# Patient Record
Sex: MALE | Race: BLACK OR AFRICAN AMERICAN | Employment: UNEMPLOYED | URBAN - METROPOLITAN AREA
[De-identification: names, ages, dates, MRNs, and addresses within clinical notes are randomized per-mention and may not be internally consistent; named-entity substitution may affect disease eponyms.]

---

## 2020-09-06 ENCOUNTER — HOSPITAL ENCOUNTER (EMERGENCY)
Age: 30
Discharge: HOME | End: 2020-09-06
Attending: EMERGENCY MEDICINE

## 2020-09-06 ENCOUNTER — APPOINTMENT (OUTPATIENT)
Dept: GENERAL RADIOLOGY | Age: 30
End: 2020-09-06
Attending: EMERGENCY MEDICINE

## 2020-09-06 VITALS
BODY MASS INDEX: 30.31 KG/M2 | RESPIRATION RATE: 12 BRPM | DIASTOLIC BLOOD PRESSURE: 80 MMHG | OXYGEN SATURATION: 99 % | TEMPERATURE: 98.7 F | SYSTOLIC BLOOD PRESSURE: 130 MMHG | WEIGHT: 200 LBS | HEIGHT: 68 IN | HEART RATE: 78 BPM

## 2020-09-06 DIAGNOSIS — S86.812A RUPTURE OF LEFT PATELLAR TENDON, INITIAL ENCOUNTER: Primary | ICD-10-CM

## 2020-09-06 PROCEDURE — 74011250637 HC RX REV CODE- 250/637: Performed by: EMERGENCY MEDICINE

## 2020-09-06 PROCEDURE — 99284 EMERGENCY DEPT VISIT MOD MDM: CPT

## 2020-09-06 PROCEDURE — 73562 X-RAY EXAM OF KNEE 3: CPT

## 2020-09-06 RX ORDER — OXYCODONE AND ACETAMINOPHEN 5; 325 MG/1; MG/1
1 TABLET ORAL ONCE
Status: COMPLETED | OUTPATIENT
Start: 2020-09-06 | End: 2020-09-06

## 2020-09-06 RX ORDER — OXYCODONE AND ACETAMINOPHEN 5; 325 MG/1; MG/1
1-2 TABLET ORAL
Qty: 20 TAB | Refills: 0 | Status: SHIPPED | OUTPATIENT
Start: 2020-09-06 | End: 2020-09-11

## 2020-09-06 RX ORDER — NAPROXEN 500 MG/1
500 TABLET ORAL 2 TIMES DAILY WITH MEALS
Qty: 20 TAB | Refills: 0 | Status: SHIPPED | OUTPATIENT
Start: 2020-09-06 | End: 2020-09-16

## 2020-09-06 RX ORDER — IBUPROFEN 800 MG/1
800 TABLET ORAL ONCE
Status: COMPLETED | OUTPATIENT
Start: 2020-09-06 | End: 2020-09-06

## 2020-09-06 RX ADMIN — IBUPROFEN 800 MG: 800 TABLET, FILM COATED ORAL at 21:43

## 2020-09-06 RX ADMIN — OXYCODONE HYDROCHLORIDE AND ACETAMINOPHEN 1 TABLET: 5; 325 TABLET ORAL at 21:43

## 2020-09-07 NOTE — ED PROVIDER NOTES
Patient is a 71-year-old male who comes to the emergency department today with left knee pain. He was playing basketball with some family member states he went to jump and felt like his left knee pulled apart. He has difficulty walking on it. The kneecap feels like it is moving. He did fall to the ground. The history is provided by the patient. Knee Pain    This is a new problem. The current episode started less than 1 hour ago. The problem occurs constantly. The problem has not changed since onset. The pain is present in the left knee. The pain is moderate. Associated symptoms include limited range of motion. Pertinent negatives include no back pain and no neck pain. The symptoms are aggravated by standing, activity and palpation. He has tried nothing for the symptoms. The treatment provided no relief. There has been a history of trauma. No past medical history on file. No past surgical history on file. No family history on file.     Social History     Socioeconomic History    Marital status: SINGLE     Spouse name: Not on file    Number of children: Not on file    Years of education: Not on file    Highest education level: Not on file   Occupational History    Not on file   Social Needs    Financial resource strain: Not on file    Food insecurity     Worry: Not on file     Inability: Not on file    Transportation needs     Medical: Not on file     Non-medical: Not on file   Tobacco Use    Smoking status: Not on file   Substance and Sexual Activity    Alcohol use: Not on file    Drug use: Not on file    Sexual activity: Not on file   Lifestyle    Physical activity     Days per week: Not on file     Minutes per session: Not on file    Stress: Not on file   Relationships    Social connections     Talks on phone: Not on file     Gets together: Not on file     Attends Hindu service: Not on file     Active member of club or organization: Not on file     Attends meetings of clubs or organizations: Not on file     Relationship status: Not on file    Intimate partner violence     Fear of current or ex partner: Not on file     Emotionally abused: Not on file     Physically abused: Not on file     Forced sexual activity: Not on file   Other Topics Concern    Not on file   Social History Narrative    Not on file         ALLERGIES: Patient has no known allergies. Review of Systems   Constitutional: Negative for chills, fatigue and fever. HENT: Negative for congestion, rhinorrhea and sore throat. Eyes: Negative for pain, discharge and visual disturbance. Respiratory: Negative for cough and shortness of breath. Cardiovascular: Negative for chest pain and palpitations. Gastrointestinal: Negative for abdominal pain, diarrhea and nausea. Endocrine: Negative for polydipsia and polyuria. Genitourinary: Negative for dysuria, frequency and urgency. Musculoskeletal: Negative for back pain and neck pain. Skin: Negative for rash. Neurological: Negative for seizures, syncope and weakness. Hematological: Negative. Vitals:    09/06/20 2058 09/06/20 2100 09/06/20 2102 09/06/20 2102   BP:       Pulse:       Resp:  12     Temp:  98.7 °F (37.1 °C)     SpO2: 97%  98% 98%   Weight:  90.7 kg (200 lb)     Height:  5' 8\" (1.727 m)              Physical Exam  Vitals signs and nursing note reviewed. Constitutional:       Appearance: Normal appearance. HENT:      Head: Normocephalic and atraumatic. Neck:      Musculoskeletal: Normal range of motion. No muscular tenderness. Cardiovascular:      Pulses: Normal pulses. Musculoskeletal:         General: Swelling and tenderness present. Comments: Very tender left knee with limited range of motion. There is a palpable effusion. The patella is also freely mobile. Able to straight leg raise. Concern for tendon rupture. Skin:     General: Skin is warm and dry. Capillary Refill: Capillary refill takes less than 2 seconds. Neurological:      General: No focal deficit present. Mental Status: He is alert and oriented to person, place, and time. Cranial Nerves: No cranial nerve deficit. Sensory: No sensory deficit. Motor: No weakness. MDM  Number of Diagnoses or Management Options  Diagnosis management comments: I wore appropriate PPE throughout this patient's ED visit. Jenna Mcconnell MD, 9:09 PM    9:47 PM  Radiologist read the left knee x-ray is negative. I think the patella is high riding. On examination the collation clearly has a patellar tendon injury and rupture with the inability to straight leg raise. His contact information is left on the [facility] referral line. We will place him into a knee immobilizer splint and crutches. We will also prescribe some pain medicine.          Amount and/or Complexity of Data Reviewed  Tests in the radiology section of CPT®: ordered and reviewed  Independent visualization of images, tracings, or specimens: yes    Risk of Complications, Morbidity, and/or Mortality  Presenting problems: low  Diagnostic procedures: low  Management options: low    Patient Progress  Patient progress: stable         Procedures

## 2020-09-07 NOTE — DISCHARGE INSTRUCTIONS
Wear the immobilizer splint as instructed. Use the crutches to keep weight off the injured leg. Take the pain medication as prescribed. Apply an ice pack to decrease any swelling. Call orthopedics on Tuesday as referred to schedule follow-up appointment.

## 2020-09-07 NOTE — ED NOTES
I have reviewed discharge instructions with the patient. The patient verbalized understanding. Patient left ED via Discharge Method: wheelchair to Home with friend. Opportunity for questions and clarification provided. Patient given 2 scripts. To continue your aftercare when you leave the hospital, you may receive an automated call from our care team to check in on how you are doing. This is a free service and part of our promise to provide the best care and service to meet your aftercare needs.  If you have questions, or wish to unsubscribe from this service please call [phone]. Thank you for Choosing our [facility] Emergency Department.

## 2020-09-10 ENCOUNTER — ANESTHESIA EVENT (OUTPATIENT)
Dept: SURGERY | Age: 30
End: 2020-09-10

## 2020-09-10 RX ORDER — SODIUM CHLORIDE 0.9 % (FLUSH) 0.9 %
5-40 SYRINGE (ML) INJECTION AS NEEDED
Status: CANCELLED | OUTPATIENT
Start: 2020-09-10

## 2020-09-10 RX ORDER — SODIUM CHLORIDE 0.9 % (FLUSH) 0.9 %
5-40 SYRINGE (ML) INJECTION EVERY 8 HOURS
Status: CANCELLED | OUTPATIENT
Start: 2020-09-10

## 2020-09-11 ENCOUNTER — ANESTHESIA (OUTPATIENT)
Dept: SURGERY | Age: 30
End: 2020-09-11

## 2020-09-11 ENCOUNTER — HOSPITAL ENCOUNTER (OUTPATIENT)
Age: 30
Setting detail: OUTPATIENT SURGERY
Discharge: HOME | End: 2020-09-11
Attending: ORTHOPAEDIC SURGERY | Admitting: ORTHOPAEDIC SURGERY

## 2020-09-11 VITALS
BODY MASS INDEX: 30.41 KG/M2 | OXYGEN SATURATION: 98 % | WEIGHT: 200 LBS | DIASTOLIC BLOOD PRESSURE: 80 MMHG | TEMPERATURE: 97.5 F | SYSTOLIC BLOOD PRESSURE: 133 MMHG | HEART RATE: 72 BPM | RESPIRATION RATE: 16 BRPM

## 2020-09-11 PROCEDURE — 77030018074 HC RTVR SUT ARTH4 S&N -B: Performed by: ORTHOPAEDIC SURGERY

## 2020-09-11 PROCEDURE — 77030002933 HC SUT MCRYL J&J -A: Performed by: ORTHOPAEDIC SURGERY

## 2020-09-11 PROCEDURE — L1832 KO ADJ JNT POS R SUP PRE CST: HCPCS | Performed by: ORTHOPAEDIC SURGERY

## 2020-09-11 PROCEDURE — 2709999900 HC NON-CHARGEABLE SUPPLY: Performed by: ORTHOPAEDIC SURGERY

## 2020-09-11 PROCEDURE — 74011250636 HC RX REV CODE- 250/636: Performed by: REGISTERED NURSE

## 2020-09-11 PROCEDURE — 77030008462 HC STPLR SKN PROX J&J -A: Performed by: ORTHOPAEDIC SURGERY

## 2020-09-11 PROCEDURE — 76210000006 HC OR PH I REC 0.5 TO 1 HR: Performed by: ORTHOPAEDIC SURGERY

## 2020-09-11 PROCEDURE — 77030002922 HC SUT FBRWRE ARTH -B: Performed by: ORTHOPAEDIC SURGERY

## 2020-09-11 PROCEDURE — 77030003377 HC NDL ABD CUT ANCH -A: Performed by: ORTHOPAEDIC SURGERY

## 2020-09-11 PROCEDURE — 76210000020 HC REC RM PH II FIRST 0.5 HR: Performed by: ORTHOPAEDIC SURGERY

## 2020-09-11 PROCEDURE — 74011250636 HC RX REV CODE- 250/636: Performed by: ANESTHESIOLOGY

## 2020-09-11 PROCEDURE — 76010010054 HC POST OP PAIN BLOCK: Performed by: ORTHOPAEDIC SURGERY

## 2020-09-11 PROCEDURE — 77030016370 HC SUT ULTBRD S&N -B: Performed by: ORTHOPAEDIC SURGERY

## 2020-09-11 PROCEDURE — 77030010509 HC AIRWY LMA MSK TELE -A: Performed by: ANESTHESIOLOGY

## 2020-09-11 PROCEDURE — 76010000162 HC OR TIME 1.5 TO 2 HR INTENSV-TIER 1: Performed by: ORTHOPAEDIC SURGERY

## 2020-09-11 PROCEDURE — 77030002966 HC SUT PDS J&J -A: Performed by: ORTHOPAEDIC SURGERY

## 2020-09-11 PROCEDURE — 77030031139 HC SUT VCRL2 J&J -A: Performed by: ORTHOPAEDIC SURGERY

## 2020-09-11 PROCEDURE — 74011250636 HC RX REV CODE- 250/636: Performed by: ORTHOPAEDIC SURGERY

## 2020-09-11 PROCEDURE — 77030003602 HC NDL NRV BLK BBMI -B: Performed by: ANESTHESIOLOGY

## 2020-09-11 PROCEDURE — 76060000034 HC ANESTHESIA 1.5 TO 2 HR: Performed by: ORTHOPAEDIC SURGERY

## 2020-09-11 PROCEDURE — 74011250636 HC RX REV CODE- 250/636: Performed by: NURSE ANESTHETIST, CERTIFIED REGISTERED

## 2020-09-11 PROCEDURE — 76942 ECHO GUIDE FOR BIOPSY: CPT | Performed by: ORTHOPAEDIC SURGERY

## 2020-09-11 PROCEDURE — 74011000250 HC RX REV CODE- 250: Performed by: REGISTERED NURSE

## 2020-09-11 PROCEDURE — 77030000032 HC CUF TRNQT ZIMM -B: Performed by: ORTHOPAEDIC SURGERY

## 2020-09-11 PROCEDURE — 74011250637 HC RX REV CODE- 250/637: Performed by: ANESTHESIOLOGY

## 2020-09-11 PROCEDURE — 74011000250 HC RX REV CODE- 250: Performed by: NURSE ANESTHETIST, CERTIFIED REGISTERED

## 2020-09-11 RX ORDER — NALOXONE HYDROCHLORIDE 0.4 MG/ML
0.1 INJECTION, SOLUTION INTRAMUSCULAR; INTRAVENOUS; SUBCUTANEOUS AS NEEDED
Status: DISCONTINUED | OUTPATIENT
Start: 2020-09-11 | End: 2020-09-11 | Stop reason: HOSPADM

## 2020-09-11 RX ORDER — FENTANYL CITRATE 50 UG/ML
INJECTION, SOLUTION INTRAMUSCULAR; INTRAVENOUS AS NEEDED
Status: DISCONTINUED | OUTPATIENT
Start: 2020-09-11 | End: 2020-09-11 | Stop reason: HOSPADM

## 2020-09-11 RX ORDER — SODIUM CHLORIDE 0.9 % (FLUSH) 0.9 %
5-40 SYRINGE (ML) INJECTION EVERY 8 HOURS
Status: DISCONTINUED | OUTPATIENT
Start: 2020-09-11 | End: 2020-09-11 | Stop reason: HOSPADM

## 2020-09-11 RX ORDER — LIDOCAINE HYDROCHLORIDE 20 MG/ML
INJECTION, SOLUTION EPIDURAL; INFILTRATION; INTRACAUDAL; PERINEURAL AS NEEDED
Status: DISCONTINUED | OUTPATIENT
Start: 2020-09-11 | End: 2020-09-11 | Stop reason: HOSPADM

## 2020-09-11 RX ORDER — PROPOFOL 10 MG/ML
INJECTION, EMULSION INTRAVENOUS AS NEEDED
Status: DISCONTINUED | OUTPATIENT
Start: 2020-09-11 | End: 2020-09-11 | Stop reason: HOSPADM

## 2020-09-11 RX ORDER — SODIUM CHLORIDE 0.9 % (FLUSH) 0.9 %
5-40 SYRINGE (ML) INJECTION AS NEEDED
Status: DISCONTINUED | OUTPATIENT
Start: 2020-09-11 | End: 2020-09-11 | Stop reason: HOSPADM

## 2020-09-11 RX ORDER — DEXAMETHASONE SODIUM PHOSPHATE 4 MG/ML
INJECTION, SOLUTION INTRA-ARTICULAR; INTRALESIONAL; INTRAMUSCULAR; INTRAVENOUS; SOFT TISSUE AS NEEDED
Status: DISCONTINUED | OUTPATIENT
Start: 2020-09-11 | End: 2020-09-11 | Stop reason: HOSPADM

## 2020-09-11 RX ORDER — HYDROCODONE BITARTRATE AND ACETAMINOPHEN 7.5; 325 MG/1; MG/1
1 TABLET ORAL AS NEEDED
Status: DISCONTINUED | OUTPATIENT
Start: 2020-09-11 | End: 2020-09-11 | Stop reason: HOSPADM

## 2020-09-11 RX ORDER — ONDANSETRON 2 MG/ML
INJECTION INTRAMUSCULAR; INTRAVENOUS AS NEEDED
Status: DISCONTINUED | OUTPATIENT
Start: 2020-09-11 | End: 2020-09-11 | Stop reason: HOSPADM

## 2020-09-11 RX ORDER — CEFAZOLIN SODIUM/WATER 2 G/20 ML
2 SYRINGE (ML) INTRAVENOUS ONCE
Status: COMPLETED | OUTPATIENT
Start: 2020-09-11 | End: 2020-09-11

## 2020-09-11 RX ORDER — FAMOTIDINE 20 MG/1
20 TABLET, FILM COATED ORAL ONCE
Status: COMPLETED | OUTPATIENT
Start: 2020-09-11 | End: 2020-09-11

## 2020-09-11 RX ORDER — LIDOCAINE HYDROCHLORIDE 10 MG/ML
0.1 INJECTION INFILTRATION; PERINEURAL AS NEEDED
Status: DISCONTINUED | OUTPATIENT
Start: 2020-09-11 | End: 2020-09-11 | Stop reason: HOSPADM

## 2020-09-11 RX ORDER — ROPIVACAINE HYDROCHLORIDE 5 MG/ML
INJECTION, SOLUTION EPIDURAL; INFILTRATION; PERINEURAL
Status: COMPLETED | OUTPATIENT
Start: 2020-09-11 | End: 2020-09-11

## 2020-09-11 RX ORDER — OXYCODONE HYDROCHLORIDE 5 MG/1
5 TABLET ORAL
Status: DISCONTINUED | OUTPATIENT
Start: 2020-09-11 | End: 2020-09-11 | Stop reason: HOSPADM

## 2020-09-11 RX ORDER — FENTANYL CITRATE 50 UG/ML
100 INJECTION, SOLUTION INTRAMUSCULAR; INTRAVENOUS ONCE
Status: COMPLETED | OUTPATIENT
Start: 2020-09-11 | End: 2020-09-11

## 2020-09-11 RX ORDER — SODIUM CHLORIDE, SODIUM LACTATE, POTASSIUM CHLORIDE, CALCIUM CHLORIDE 600; 310; 30; 20 MG/100ML; MG/100ML; MG/100ML; MG/100ML
100 INJECTION, SOLUTION INTRAVENOUS CONTINUOUS
Status: DISCONTINUED | OUTPATIENT
Start: 2020-09-11 | End: 2020-09-11 | Stop reason: HOSPADM

## 2020-09-11 RX ORDER — HYDROMORPHONE HYDROCHLORIDE 2 MG/ML
0.5 INJECTION, SOLUTION INTRAMUSCULAR; INTRAVENOUS; SUBCUTANEOUS
Status: DISCONTINUED | OUTPATIENT
Start: 2020-09-11 | End: 2020-09-11 | Stop reason: HOSPADM

## 2020-09-11 RX ORDER — SODIUM CHLORIDE 9 MG/ML
25 INJECTION, SOLUTION INTRAVENOUS CONTINUOUS
Status: DISCONTINUED | OUTPATIENT
Start: 2020-09-11 | End: 2020-09-11 | Stop reason: HOSPADM

## 2020-09-11 RX ORDER — MIDAZOLAM HYDROCHLORIDE 1 MG/ML
2 INJECTION, SOLUTION INTRAMUSCULAR; INTRAVENOUS
Status: COMPLETED | OUTPATIENT
Start: 2020-09-11 | End: 2020-09-11

## 2020-09-11 RX ADMIN — FENTANYL CITRATE 25 MCG: 50 INJECTION INTRAMUSCULAR; INTRAVENOUS at 12:32

## 2020-09-11 RX ADMIN — SODIUM CHLORIDE, SODIUM LACTATE, POTASSIUM CHLORIDE, AND CALCIUM CHLORIDE 100 ML/HR: 600; 310; 30; 20 INJECTION, SOLUTION INTRAVENOUS at 11:53

## 2020-09-11 RX ADMIN — HYDROCODONE BITARTRATE AND ACETAMINOPHEN 1 TABLET: 7.5; 325 TABLET ORAL at 14:19

## 2020-09-11 RX ADMIN — DEXAMETHASONE SODIUM PHOSPHATE 4 MG: 4 INJECTION, SOLUTION INTRAMUSCULAR; INTRAVENOUS at 13:21

## 2020-09-11 RX ADMIN — ONDANSETRON 4 MG: 2 INJECTION INTRAMUSCULAR; INTRAVENOUS at 13:21

## 2020-09-11 RX ADMIN — HYDROMORPHONE HYDROCHLORIDE 0.5 MG: 2 INJECTION INTRAMUSCULAR; INTRAVENOUS; SUBCUTANEOUS at 14:12

## 2020-09-11 RX ADMIN — FENTANYL CITRATE 100 MCG: 50 INJECTION, SOLUTION INTRAMUSCULAR; INTRAVENOUS at 11:53

## 2020-09-11 RX ADMIN — Medication 2 G: at 12:28

## 2020-09-11 RX ADMIN — MIDAZOLAM 2 MG: 1 INJECTION INTRAMUSCULAR; INTRAVENOUS at 11:54

## 2020-09-11 RX ADMIN — FAMOTIDINE 20 MG: 20 TABLET, FILM COATED ORAL at 11:53

## 2020-09-11 RX ADMIN — PROPOFOL 200 MG: 10 INJECTION, EMULSION INTRAVENOUS at 12:23

## 2020-09-11 RX ADMIN — LIDOCAINE HYDROCHLORIDE 80 MG: 20 INJECTION, SOLUTION EPIDURAL; INFILTRATION; INTRACAUDAL; PERINEURAL at 12:23

## 2020-09-11 RX ADMIN — FENTANYL CITRATE 75 MCG: 50 INJECTION INTRAMUSCULAR; INTRAVENOUS at 12:23

## 2020-09-11 RX ADMIN — ROPIVACAINE HYDROCHLORIDE 30 ML: 150 INJECTION, SOLUTION EPIDURAL; INFILTRATION; PERINEURAL at 11:56

## 2020-09-11 RX ADMIN — PHENYLEPHRINE HYDROCHLORIDE 100 MCG: 10 INJECTION INTRAVENOUS at 13:05

## 2020-09-11 NOTE — ANESTHESIA PREPROCEDURE EVALUATION
Relevant Problems   No relevant active problems       Anesthetic History               Review of Systems / Medical History  Patient summary reviewed and pertinent labs reviewed    Pulmonary          Smoker         Neuro/Psych              Cardiovascular                  Exercise tolerance: >4 METS     GI/Hepatic/Renal                Endo/Other             Other Findings              Physical Exam    Airway  Mallampati: II  TM Distance: > 6 cm  Neck ROM: normal range of motion   Mouth opening: Normal     Cardiovascular  Regular rate and rhythm,  S1 and S2 normal,  no murmur, click, rub, or gallop             Dental  No notable dental hx       Pulmonary  Breath sounds clear to auscultation               Abdominal         Other Findings            Anesthetic Plan    ASA: 2  Anesthesia type: general      Post-op pain plan if not by surgeon: peripheral nerve block single      Anesthetic plan and risks discussed with: Patient

## 2020-09-11 NOTE — DISCHARGE INSTRUCTIONS
Post-Operative Instructions   For  Anterior Cruciate Ligament Reconstruction  Phone:  [phone]    1. Unless otherwise instructed, you may place as much weight as tolerated on the operated leg. Use crutches to help with ambulation. 2.  If you do not have an \"Iceman\" type cooling unit, for the first 48-72 hours following surgery, use ice on the knee every two hours (while awake) for 20-30 minutes at a time to help prevent swelling and lessen pain. If you have a cooling unit, follow the instructions given to you- continually as much as possible the first 48-72 hours, then 3-4 times a day for 4 weeks. Elevate leg. 3. You have been given a hinged brace. Keep the brace locked in a straight position at all times until you begin therapy. 4. Wrap your leg with a large plastic bag to keep the dressings dry while you shower. 5. Use any pain medication as instructed. You should take your pain medication as soon as you feel the anesthetic wearing off. Do not wait until you are in severe pain to begin taking your pain medication. 6. You may have some side effects from your pain medication. If you have nausea, try taking your medication with food. For itching, you may take over the counter Benadryl. 7. Begin therapy as ordered    8. You may have been given a prescription for Zofran or Phenergan. This medication is used for nausea and vomiting. You do not need to get this prescription filled unless you have a problem. 9. If you have a problem, please call [address] [place] [address] at [phone]    Cami Elliott MD    [address] [place] [address], P.A[facility] ACTIVITY  · As tolerated and as directed by your doctor. · Bathe or shower as directed by your doctor. DIET  · Clear liquids until no nausea or vomiting; then light diet for the first day. · Advance to regular diet on second day, unless your doctor orders otherwise. · If nausea and vomiting continues, call your doctor. PAIN  · Take pain medication as directed by your doctor. · Call your doctor if pain is NOT relieved by medication. · DO NOT take aspirin of blood thinners unless directed by your doctor. DRESSING CARE       CALL YOUR DOCTOR IF   · Excessive bleeding that does not stop after holding pressure over the area  · Temperature of 101 degrees F or above  · Excessive redness, swelling or bruising, and/ or green or yellow, smelly discharge from incision    AFTER ANESTHESIA   · For the first 24 hours: DO NOT Drive, Drink alcoholic beverages, or Make important decisions. · Be aware of dizziness following anesthesia and while taking pain medication. APPOINTMENT DATE/ TIME    YOUR DOCTOR'S PHONE NUMBER       DISCHARGE SUMMARY from Nurse    PATIENT INSTRUCTIONS:    After general anesthesia or intravenous sedation, for 24 hours or while taking prescription Narcotics:  · Limit your activities  · Do not drive and operate hazardous machinery  · Do not make important personal or business decisions  · Do  not drink alcoholic beverages  · If you have not urinated within 8 hours after discharge, please contact your surgeon on call. *  Please give a list of your current medications to your Primary Care Provider. *  Please update this list whenever your medications are discontinued, doses are      changed, or new medications (including over-the-counter products) are added. *  Please carry medication information at all times in case of emergency situations. These are general instructions for a healthy lifestyle:    No smoking/ No tobacco products/ Avoid exposure to second hand smoke    Surgeon General's Warning:  Quitting smoking now greatly reduces serious risk to your health.     Obesity, smoking, and sedentary lifestyle greatly increases your risk for illness    A healthy diet, regular physical exercise & weight monitoring are important for maintaining a healthy lifestyle    You may be retaining fluid if you have a history of heart failure or if you experience any of the following symptoms:  Weight gain of 3 pounds or more overnight or 5 pounds in a week, increased swelling in our hands or feet or shortness of breath while lying flat in bed. Please call your doctor as soon as you notice any of these symptoms; do not wait until your next office visit. Recognize signs and symptoms of STROKE:    F-face looks uneven    A-arms unable to move or move unevenly    S-speech slurred or non-existent    T-time-call 911 as soon as signs and symptoms begin-DO NOT go       Back to bed or wait to see if you get better-TIME IS BRAIN. Advance Care Planning  People with COVID-19 may have no symptoms, mild symptoms, such as fever, cough, and shortness of breath or they may have more severe illness, developing severe and fatal pneumonia. As a result, Advance Care Planning with attention to naming a health care decision maker (someone you trust to make healthcare decisions for you if you could not speak for yourself) and sharing other health care preferences is important BEFORE a possible health crisis. Please contact your Primary Care Provider to discuss Advance Care Planning. Preventing the Spread of Coronavirus Disease 2019 in Homes and Residential Communities  For the most recent information go to [email or web address]    Prevention steps for People with confirmed or suspected COVID-19 (including persons under investigation) who do not need to be hospitalized  and   People with confirmed COVID-19 who were hospitalized and determined to be medically stable to go home    Your healthcare provider and public health staff will evaluate whether you can be cared for at home. If it is determined that you do not need to be hospitalized and can be isolated at home, you will be monitored by staff from your local or state health department.  You should follow the prevention steps below until a healthcare provider or local or state health department says you can return to your normal activities. Stay home except to get medical care  People who are mildly ill with COVID-19 are able to isolate at home during their illness. You should restrict activities outside your home, except for getting medical care. Do not go to work, school, or public areas. Avoid using public transportation, ride-sharing, or taxis. Separate yourself from other people and animals in your home  People: As much as possible, you should stay in a specific room and away from other people in your home. Also, you should use a separate bathroom, if available. Animals: You should restrict contact with pets and other animals while you are sick with COVID-19, just like you would around other people. Although there have not been reports of pets or other animals becoming sick with COVID-19, it is still recommended that people sick with COVID-19 limit contact with animals until more information is known about the virus. When possible, have another member of your household care for your animals while you are sick. If you are sick with COVID-19, avoid contact with your pet, including petting, snuggling, being kissed or licked, and sharing food. If you must care for your pet or be around animals while you are sick, wash your hands before and after you interact with pets and wear a facemask. Call ahead before visiting your doctor  If you have a medical appointment, call the healthcare provider and tell them that you have or may have COVID-19. This will help the healthcare providers office take steps to keep other people from getting infected or exposed. Wear a facemask  You should wear a facemask when you are around other people (e.g., sharing a room or vehicle) or pets and before you enter a healthcare providers office.  If you are not able to wear a facemask (for example, because it causes trouble breathing), then people who live with you should not stay in the same room with you, or they should wear a facemask if they enter your room. Cover your coughs and sneezes  Cover your mouth and nose with a tissue when you cough or sneeze. Throw used tissues in a lined trash can. Immediately wash your hands with soap and water for at least 20 seconds or, if soap and water are not available, clean your hands with an alcohol-based hand  that contains at least 60% alcohol. Clean your hands often  Wash your hands often with soap and water for at least 20 seconds, especially after blowing your nose, coughing, or sneezing; going to the bathroom; and before eating or preparing food. If soap and water are not readily available, use an alcohol-based hand  with at least 60% alcohol, covering all surfaces of your hands and rubbing them together until they feel dry. Soap and water are the best option if hands are visibly dirty. Avoid touching your eyes, nose, and mouth with unwashed hands. Avoid sharing personal household items  You should not share dishes, drinking glasses, cups, eating utensils, towels, or bedding with other people or pets in your home. After using these items, they should be washed thoroughly with soap and water. Clean all high-touch surfaces everyday  High touch surfaces include counters, tabletops, doorknobs, bathroom fixtures, toilets, phones, keyboards, tablets, and bedside tables. Also, clean any surfaces that may have blood, stool, or body fluids on them. Use a household cleaning spray or wipe, according to the label instructions. Labels contain instructions for safe and effective use of the cleaning product including precautions you should take when applying the product, such as wearing gloves and making sure you have good ventilation during use of the product. Monitor your symptoms  Seek prompt medical attention if your illness is worsening (e.g., difficulty breathing).  Before seeking care, call your healthcare provider and tell them that you have, or are being evaluated for, COVID-19. Put on a facemask before you enter the facility. These steps will help the healthcare providers office to keep other people in the office or waiting room from getting infected or exposed. Ask your healthcare provider to call the local or state health department. Persons who are placed under active monitoring or facilitated self-monitoring should follow instructions provided by their local health department or occupational health professionals, as appropriate. When working with your local health department check their available hours. If you have a medical emergency and need to call 911, notify the dispatch personnel that you have, or are being evaluated for COVID-19. If possible, put on a facemask before emergency medical services arrive. Discontinuing home isolation  Patients with confirmed COVID-19 should remain under home isolation precautions until the risk of secondary transmission to others is thought to be low. The decision to discontinue home isolation precautions should be made on a case-by-case basis, in consultation with healthcare providers and state and local health departments.

## 2020-09-11 NOTE — OP NOTES
[address] [facility]  OPERATIVE REPORT    Name:  Jacqueline Gilmore  MR#:  [id number]  :  1990  ACCOUNT #:  [de-identified]  DATE OF SERVICE:  2020    PREOPERATIVE DIAGNOSIS:  Left patellar tendon tear. POSTOPERATIVE DIAGNOSIS:  Left patellar tendon tear. PROCEDURE PERFORMED:  Repair of the left patellar tendon. SURGEON:  Dalton Foster MD    ASSISTANT:  none    ANESTHESIA:  General.    COMPLICATIONS:   none. SPECIMENS REMOVED:  none. IMPLANTS:  none. ESTIMATED BLOOD LOSS:  20 mL. PROCEDURE:  After an adequate level of general anesthesia was obtained, the left leg was prepped and draped in the usual sterile fashion. Tourniquet was used for part of the procedure. Received antibiotics and had a block per Anesthesia for postop pain management. A longitudinal incision was made over the patellar tendon. Dissection was carried down through the subcutaneous tissue. The patient had a complete tear of the patellar tendon with tear of the retinaculum as well. The knee was then copiously irrigated. The superior pole of the patella was identified as well as the tibial tubercle. He had a good tendon even though was more , but he still had good tissue for the repair and a fairly thick tendon. The repair was performed by making drill holes in the bone. Three drill holes were made in the patella and brought out superiorly on the superior pole of the patella. Passing wires were used. A #2 FiberWire was used to weave the patellar tendon. The sutures were passed through the drill holes and then tied at the top of the patellar tendon. Good repair was performed. This was supplemented by using #5 FiberWire by drilling a hole through the tibial tubercle and the FiberWire was passed and then with a cerclage type repair. The cerclage suture was placed to augment the repair. The retinaculum was closed with a combination of FiberWire and PDS suture.   The tourniquet was released and hemostasis obtained. It was were very stable. The knee was put through some limited range of motion and was very stable. The wound was copiously irrigated. The deep tissue was closed with Vicryl, subcutaneous tissue with Monocryl, staples on the skin and sterile dressings applied, was placed in a hinged knee brace locked in full extension with careful instructions given to the family preoperatively and postop. The patient tolerated the procedure well.       Jasmin Tavarez MD      [id number]  D:  09/11/2020 13:56  T:  09/11/2020 14:41  JOB #:  [id number]  CC:  [facility]

## 2020-09-11 NOTE — ANESTHESIA POSTPROCEDURE EVALUATION
Procedure(s):  LEFT PATELLA TENDON REPAIR/ AUTOGRAFT. general    Anesthesia Post Evaluation      Multimodal analgesia: multimodal analgesia used between 6 hours prior to anesthesia start to PACU discharge  Patient location during evaluation: PACU  Patient participation: complete - patient participated  Level of consciousness: awake and awake and alert  Pain management: adequate  Airway patency: patent  Anesthetic complications: no  Cardiovascular status: acceptable  Respiratory status: acceptable  Hydration status: acceptable  Post anesthesia nausea and vomiting:  controlled      INITIAL Post-op Vital signs:   Vitals Value Taken Time   /87 9/11/2020  2:33 PM   Temp 36.4 °C (97.5 °F) 9/11/2020  2:00 PM   Pulse 72 9/11/2020  2:37 PM   Resp 14 9/11/2020  2:13 PM   SpO2 96 % 9/11/2020  2:37 PM   Vitals shown include unvalidated device data.

## 2020-09-11 NOTE — H&P
Outpatient Surgery History and Physical:  George Lawton was seen and examined. CHIEF COMPLAINT:   LT knee. HPI   compl  L knee rupture patella tendon jumping  PE: There were no vitals taken for this visit. Heart:   Regular rhythm      Lungs:  Are clear    Unable to ext knee defect  Past Medical History: There are no active problems to display for this patient. Surgical History:   Past Surgical History:   Procedure Laterality Date    HX WISDOM TEETH EXTRACTION         Social History: Patient  reports that he has been smoking. He has a 10.00 pack-year smoking history. He has never used smokeless tobacco. He reports current alcohol use. He reports previous drug use. Family History: No family history on file. Allergies: Reviewed per EMR  No Known Allergies    Medications:    No current facility-administered medications on file prior to encounter. Current Outpatient Medications on File Prior to Encounter   Medication Sig    naproxen (Naprosyn) 500 mg tablet Take 1 Tab by mouth two (2) times daily (with meals) for 10 days.  oxyCODONE-acetaminophen (Percocet) 5-325 mg per tablet Take 1-2 Tabs by mouth every six (6) hours as needed for Pain for up to 5 days. Max Daily Amount: 8 Tabs. The surgery is planned for the LT knee . History and physical has been reviewed. The patient has been examined. There have been no significant clinical changes since the completion of the originally dated History and Physical.  Patient identified by surgeon; surgical site was confirmed by patient and surgeon. The patient is here today for outpatient surgery. I have examined the patient, no changes are noted in the patient's medical status. Necessity for the procedure/care is still present and the history and physical above is current. See the office notes for the full long term history of the problem. Please see the recent office notes for the musculoskeletal examination.     Signed By: Pastora Fajardo Don Castro MD     September 11, 2020 7:01 AM       History and Physical

## 2020-09-11 NOTE — ANESTHESIA PROCEDURE NOTES
Peripheral Block    Start time: 9/11/2020 11:46 AM  End time: 9/11/2020 11:50 AM  Performed by: Mina Magana MD  Authorized by: Mina Magana MD       Pre-procedure:    Indications: at surgeon's request and post-op pain management    Preanesthetic Checklist: patient identified, risks and benefits discussed, site marked, timeout performed, anesthesia consent given and patient being monitored    Timeout Time: 11:46          Block Type:   Block Type:  Femoral single shot  Laterality:  Left  Monitoring:  Continuous pulse ox, frequent vital sign checks, heart rate, oxygen and responsive to questions  Injection Technique:  Single shot  Procedures: ultrasound guided and nerve stimulator    Patient Position: supine  Prep: DuraPrep    Location:  Upper thigh  Needle Type:  Stimuplex  Needle Gauge:  20 G  Needle Localization:  Nerve stimulator and ultrasound guidance  Motor Response: minimal motor response >0.4 mA      Assessment:  Number of attempts:  1  Injection Assessment:  Incremental injection every 5 mL, local visualized surrounding nerve on ultrasound, negative aspiration for blood, no paresthesia, no intravascular symptoms and ultrasound image on chart  Patient tolerance:  Patient tolerated the procedure well with no immediate complications

## 2020-10-29 ENCOUNTER — HOSPITAL ENCOUNTER (OUTPATIENT)
Dept: PHYSICAL THERAPY | Age: 30
Discharge: HOME | End: 2020-10-29

## 2020-10-29 PROCEDURE — 97161 PT EVAL LOW COMPLEX 20 MIN: CPT

## 2020-10-29 NOTE — THERAPY EVALUATION
Allison Marx  : 1990  Primary: [employer]  Secondary:  Therapy Center at [address], [address] [facility]  [phone]   [phone]          OUTPATIENT PHYSICAL THERAPY:Initial Assessment 10/29/2020   ICD-10: Treatment Diagnosis:   Muscle weakness (generalized) (M62.81); Difficulty in walking, not elsewhere classified (R26.2)  Pain in left knee (M25.562)  Stiffness of left knee, not elsewhere classified (U48.530)   Precautions/Allergies:   Patient has no known allergies. TREATMENT PLAN:  Effective Dates: 10/29/2020 TO 2021 (90 days). Frequency/Duration: 2 times a week for 90 Day(s) MEDICAL/REFERRING DIAGNOSIS:  Pain in left knee [M25.562]  Strain of other muscle(s) and tendon(s) at lower leg level, left leg, initial encounter [P60.597H]   DATE OF ONSET: 20  REFERRING PHYSICIAN: Lenita Epley, MD MD Orders: scott treat  Return MD Appointment: 20     INITIAL ASSESSMENT:  Mr. Ralph Guadarrama presents with decreased strength and range of motion and with increased pain at times s/p L patella tendon repiar with guarding at times. He will benefit from skilled PT interventions to maximize independence with functional mobility and p/o exercises. Pt did well with evaluation and should progress well with skilled PT for s/p L patella tend repair. PROBLEM LIST (Impacting functional limitations):  1. Decreased Strength  2. Decreased ADL/Functional Activities  3. Decreased Transfer Abilities  4. Decreased Ambulation Ability/Technique  5. Decreased Balance  6. Increased Pain  7. Decreased Activity Tolerance  8. Decreased Flexibility/Joint Mobility  9. Edema/Girth INTERVENTIONS PLANNED: (Treatment may consist of any combination of the following)  1. Balance Exercise  2. Cryotherapy  3. Electrical Stimulation  4. Gait Training  5. Home Exercise Program (HEP)  6. Manual Therapy  7. Neuromuscular Re-education/Strengthening  8.  Range of Motion (ROM)  9. Therapeutic Activites  10. Therapeutic Exercise/Strengthening  11. Transfer Training  12. vasocompression     GOALS: (Goals have been discussed and agreed upon with patient.)  Short term GOALS: Time Frame: 6 weeks                                                                                                                                       1.  Pt will be compliant and independent with advanced HEP in order to increase post pat tendon repair mobility and strength of the L LE.     2. Pt will increase score on the LEFS to 30/80 in order to demonstrate improved functional mobility and quality of life. 3. Pt will report standing for > 10 minutes with minimal to no increase in pain in order to be able to stand for prolonged periods as needed to perform standing for job. DISCHARGE GOALS: Time Frame: 12 weeks                                                                                                                                       1.  Pt will be compliant and independent with advanced HEP in order to increase post pat tendon repair mobility and strength of the L LE.     2. Pt will increase score on the LEFS to 55/80 in order to demonstrate improved functional mobility and quality of life. 3. Pt will report standing for > 30 minutes with minimal to no increase in pain in order to be able to stand for prolonged periods as needed to perform standing for job. 4. Patient will be able to  tolerate 8hr shift at work if cleared by MD.    OUTCOME MEASURE:   Tool Used: Lower Extremity Functional Scale (LEFS)  Score:  Initial: 18/80 Most Recent: X/80 (Date: -- )   Interpretation of Score: 20 questions each scored on a 5 point scale with 0 representing \"extreme difficulty or unable to perform\" and 4 representing \"no difficulty\". The lower the score, the greater the functional disability. 80/80 represents no disability. Minimal detectable change is 9 points.       MEDICAL NECESSITY: · Skilled intervention continues to be required due to decreased function. REASON FOR SERVICES/OTHER COMMENTS:  · Patient continues to require skilled intervention due to medical complications and patient unable to attend/participate in therapy as expected. Total Duration:  PT Patient Time In/Time Out  Time In: 1300  Time Out: 1345    Rehabilitation Potential For Stated Goals: Excellent  Regarding Linda Garcia's therapy, I certify that the treatment plan above will be carried out by a therapist or under their direction. Thank you for this referral,  Patrica Henson DPT     Referring Physician Signature: Arun Moreno MD _______________________________ Date _____________     PAIN/SUBJECTIVE:   Initial:   0 Post Session:  0/10   HISTORY:   History of Injury/Illness (Reason for Referral):  Pt reports tearing L patella tendon playing basketball on 9.6.2020. he had surgical repair on 9.11.2020. since then he has been NWB and minimal exer with ROM at odeg, bilat AC. He went to MD yesterday which lifted WB status to WBAT with using AC, ROM brace stays at 0deg. Past Medical History/Comorbidities:   Mr. Luci Romero  has no past medical history on file. Mr. Luci Romero  has a past surgical history that includes hx wisdom teeth extraction. Social History/Living Environment:     pt lives in house with spouse and infant, 1 story. Prior Level of Function/Work/Activity:  He is completely IND and very active with sports as exercise. 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 He plans to return when cleared. Hopes to return ASAP possibly with limited duty. Drives. .      Ambulatory/Rehab Services H2 Model Falls Risk Assessment   Risk Factors:       (1)  Gender [Male] Ability to Rise from Chair:       (0)  Ability to rise in a single movement   Falls Prevention Plan:        Mobility Assistance Device (specify):  [facility]   10/29/2020  Total: (5 or greater = High Risk): 1   ©2010 [facility] of [place] [employer] States Patent #[id number]. Federal Law prohibits the replication, distribution or use without written permission from [facility] of [employer]   Current Medications:     No current outpatient medications on file. Date Last Reviewed:  10/29/2020     Number of Personal Factors/Comorbidities that affect the Plan of Care: 0: LOW COMPLEXITY   EXAMINATION:   Observation/Orthostatic Postural Assessment:          Pt ambulating in NWB, despite WBAT status. Lots of fear with condition. Palpation:          No tenderness, warmth, dry skin, wound still healing. Patella mob 1/6* due to restrictions  ROM:            Range of Motion             Joint: Date:  10/29/2020  Date:   Date:       Right Left Right Left Right Left   HIP             Flex 60  60  AAROM           Abduction 40 40           KNEE               Flexion  120 0           Extension  0 0           ANKLE         DF WNL WNL                                STRENGTH               Joint: Date:   10/29/2020   Date:   Date:       Right Left Right Left Right Left   Ankle DF 5/5 5/5           Knee extension 5/5 NT           Hip flexion 5/5 1/5                        girth  41cm          Body Structures Involved:  1. Nerves  2. Eyes and Ears  3. Bones  4. Joints  5. Muscles  6. Ligaments Body Functions Affected:  1. Mental  2. Sensory/Pain  3. Neuromusculoskeletal  4. Movement Related Activities and Participation Affected:  1. General Tasks and Demands  2. Mobility  3. Self Care  4. Domestic Life  5.  Community, Social and [place]   Number of elements (examined above) that affect the Plan of Care: 1-2: LOW COMPLEXITY   CLINICAL PRESENTATION:   Presentation: Stable and uncomplicated: LOW COMPLEXITY   CLINICAL DECISION MAKING:   Use of outcome tool(s) and clinical judgement create a POC that gives a: Clear prediction of patient's progress: LOW COMPLEXITY

## 2020-11-03 ENCOUNTER — HOSPITAL ENCOUNTER (OUTPATIENT)
Dept: PHYSICAL THERAPY | Age: 30
Discharge: HOME | End: 2020-11-03

## 2020-11-03 PROCEDURE — 97016 VASOPNEUMATIC DEVICE THERAPY: CPT

## 2020-11-03 PROCEDURE — 97140 MANUAL THERAPY 1/> REGIONS: CPT

## 2020-11-03 PROCEDURE — 97110 THERAPEUTIC EXERCISES: CPT

## 2020-11-03 NOTE — PROGRESS NOTES
Papi Chenney  : 1990  Primary:   Secondary:  [address] at [facility]  [address], [address], [place], [facility] [address] [facility]  [phone]   [phone]        OUTPATIENT PHYSICAL THERAPY: Daily Treatment Note 11/3/2020  Visit Count:  2    L Pat tend repair    Pre-treatment Symptoms/Complaints:  Pt becoming more comfortable with walking  Pain: Initial:   0/10 Post Session:  0/10   Medications Last Reviewed:  11/3/2020  Updated Objective Findings:  using 1 crutch at arrival  TREATMENT:     THERAPEUTIC ACTIVITY: ( 0 minutes): Therapeutic activities per grid below to improve mobility, strength, balance and coordination. Required maximal verbal and manual cues to promote static and dynamic balance in standing, promote coordination of bilateral, lower extremity(s) and promote motor control of bilateral, lower extremity(s). THERAPEUTIC EXERCISE: (15 minutes):  Exercises per grid below to improve mobility, strength, balance and coordination. Required maximal verbal cues to promote proper body alignment, promote proper body posture and promote proper body breathing techniques. Progressed range and complexity of movement as indicated. NEUROMUSCULAR RE-EDUCATION: (0 minutes):  Exercise/activities per grid below to improve balance, coordination, kinesthetic sense, posture and proprioception. Required moderate verbal cues to promote static and dynamic balance in standing, promote coordination of bilateral, lower extremity(s) and promote motor control of bilateral, lower extremity(s). MANUAL THERAPY: (25 minutes): Joint mobilization, Soft tissue mobilization and Manipulation was utilized and necessary because of the patient's restricted joint motion, painful spasm, loss of articular motion and restricted motion of soft tissue.     Date:  11/3/20 Date:   Date:     Activity/Exercise Parameters Parameters Parameters   STM Patella mobs, quad, tend     PROM Knee flex man and dangling vasocomp x15min                                 [facility]  Treatment/Session Summary:    · Response to Treatment:  pt better with edema today. Beginning today with PROM flex, dangling and manual.   · Communication/Consultation:  None today  · Equipment provided today:  None today  · Recommendations/Intent for next treatment session: Next visit will focus on mobility, stretching, ROM, .     Total Treatment Billable Duration:  45 minutes  PT Patient Time In/Time Out  Time In: 0930  Time Out: 1434 [facility], DPT    Future Appointments   Date Time Provider Yinka Verma   11/5/2020  9:30 AM Iam Gonzalez, PT, DPT [facility]

## 2020-11-05 ENCOUNTER — HOSPITAL ENCOUNTER (OUTPATIENT)
Dept: PHYSICAL THERAPY | Age: 30
Discharge: HOME | End: 2020-11-05

## 2020-11-05 PROCEDURE — 97140 MANUAL THERAPY 1/> REGIONS: CPT

## 2020-11-05 PROCEDURE — 97110 THERAPEUTIC EXERCISES: CPT

## 2020-11-10 ENCOUNTER — HOSPITAL ENCOUNTER (OUTPATIENT)
Dept: PHYSICAL THERAPY | Age: 30
Discharge: HOME | End: 2020-11-10

## 2020-11-10 PROCEDURE — 97140 MANUAL THERAPY 1/> REGIONS: CPT

## 2020-11-10 PROCEDURE — 97016 VASOPNEUMATIC DEVICE THERAPY: CPT

## 2020-11-10 PROCEDURE — 97110 THERAPEUTIC EXERCISES: CPT

## 2020-11-10 NOTE — PROGRESS NOTES
Jaxon Doherty  : 1990  Primary:   Secondary:  [address] at [facility]  [address], [address] [facility] [place], [address] [place] [address]  [phone]   [phone]        OUTPATIENT PHYSICAL THERAPY: Daily Treatment Note 11/10/2020  Visit Count:  4    L Pat tend repair    Pre-treatment Symptoms/Complaints:  Pt becoming more comfortable with walking  Pain: Initial:   010 Post Session:  0/10   Medications Last Reviewed:  11/10/2020  Updated Objective Findings:  using 1 crutch at arrival  TREATMENT:     THERAPEUTIC ACTIVITY: ( 0 minutes): Therapeutic activities per grid below to improve mobility, strength, balance and coordination. Required maximal verbal and manual cues to promote static and dynamic balance in standing, promote coordination of bilateral, lower extremity(s) and promote motor control of bilateral, lower extremity(s). THERAPEUTIC EXERCISE: (15 minutes):  Exercises per grid below to improve mobility, strength, balance and coordination. Required maximal verbal cues to promote proper body alignment, promote proper body posture and promote proper body breathing techniques. Progressed range and complexity of movement as indicated. NEUROMUSCULAR RE-EDUCATION: (0 minutes):  Exercise/activities per grid below to improve balance, coordination, kinesthetic sense, posture and proprioception. Required moderate verbal cues to promote static and dynamic balance in standing, promote coordination of bilateral, lower extremity(s) and promote motor control of bilateral, lower extremity(s). MANUAL THERAPY: (25 minutes): Joint mobilization, Soft tissue mobilization and Manipulation was utilized and necessary because of the patient's restricted joint motion, painful spasm, loss of articular motion and restricted motion of soft tissue.     Date:  11/3/20 Date:  11/10/20 Date:     Activity/Exercise Parameters Parameters Parameters   STM Patella mobs, quad, tend Patella mobs, quad, tend PROM Knee flex man and dangling Knee flex man and dangling    vasocomp x15min x15min    scifit  6min  Seat at 21  Brace unlocked  Knee flexion to tolerance    Quad sets  X20, bilat                    [facility] Portal  Treatment/Session Summary:    · Response to Treatment:  pt states improving feeling in knee, dangle exer is producing a little more flex slowly. up to 30deg off mat. · Communication/Consultation:  None today  · Equipment provided today:  None today  · Recommendations/Intent for next treatment session: Next visit will focus on mobility, stretching, ROM, .     Total Treatment Billable Duration:  45 minutes  PT Patient Time In/Time Out  Time In: 1015  Time Out: 224 [facility], DPT    Future Appointments   Date Time Provider Yinka Verma   11/12/2020 10:15 AM Mynor Sewell DPT [facility]   11/17/2020 10:15 AM CHADD Yeboah D   11/19/2020 10:15 AM CHADD Yeboah D   11/24/2020 10:15 AM CHADD Yeboah [facility]

## 2020-11-17 ENCOUNTER — HOSPITAL ENCOUNTER (OUTPATIENT)
Dept: PHYSICAL THERAPY | Age: 30
Discharge: HOME | End: 2020-11-17

## 2020-11-17 PROCEDURE — 97016 VASOPNEUMATIC DEVICE THERAPY: CPT

## 2020-11-17 PROCEDURE — 97140 MANUAL THERAPY 1/> REGIONS: CPT

## 2020-11-17 PROCEDURE — 97110 THERAPEUTIC EXERCISES: CPT

## 2020-11-17 NOTE — PROGRESS NOTES
Jerryminervapeyton Fidencio  : 1990  Primary:   Secondary:  [address] at   [address], [address], [place], [address] [facility]  [phone]   [phone]        OUTPATIENT PHYSICAL THERAPY: Daily Treatment Note 2020  Visit Count:  5    L Pat tend repair    Pre-treatment Symptoms/Complaints:  Pt reports sitting on wsher more often and can now get leg all the way down without using R foot for support. Pain: Initial:   0/10 Post Session:  0/10   Medications Last Reviewed:  2020  Updated Objective Findings:  using 1 crutch at arrival  TREATMENT:     THERAPEUTIC ACTIVITY: ( 0 minutes): Therapeutic activities per grid below to improve mobility, strength, balance and coordination. Required maximal verbal and manual cues to promote static and dynamic balance in standing, promote coordination of bilateral, lower extremity(s) and promote motor control of bilateral, lower extremity(s). THERAPEUTIC EXERCISE: (15 minutes):  Exercises per grid below to improve mobility, strength, balance and coordination. Required maximal verbal cues to promote proper body alignment, promote proper body posture and promote proper body breathing techniques. Progressed range and complexity of movement as indicated. NEUROMUSCULAR RE-EDUCATION: (0 minutes):  Exercise/activities per grid below to improve balance, coordination, kinesthetic sense, posture and proprioception. Required moderate verbal cues to promote static and dynamic balance in standing, promote coordination of bilateral, lower extremity(s) and promote motor control of bilateral, lower extremity(s). MANUAL THERAPY: (25 minutes): Joint mobilization, Soft tissue mobilization and Manipulation was utilized and necessary because of the patient's restricted joint motion, painful spasm, loss of articular motion and restricted motion of soft tissue.     Date:  11/3/20 Date:  11/10/20 Date:  20 Date   20   Activity/Exercise Parameters Parameters Parameters    STM Patella mobs, quad, tend `Patella mobs, quad, tend Patella mobs, quad, tend. MWM pat with QS Patella mobs, quad, tend. In supine and sitting   PROM Knee flex man and dangling Knee flex man and dangling  Dangle, w pt STM on quad   vasocomp x15min x15min x15 x15min   scifit  6min  Seat at 21  Brace unlocked  Knee flexion to tolerance 10min, seat at  10min, seat at [employer]  X20, bilat X20, MWM x20   SAQ    w sequencing, x15          vasocompression  15min on L knee    [facility] Portal  Treatment/Session Summary:    · Response to Treatment:  pt improving with decreased in joint, some still remains around VL and lateral prox tibial area. ROM still at 50deg, but pt not using R foot for support. · Communication/Consultation:  None today  · Equipment provided today:  None today  · Recommendations/Intent for next treatment session: Next visit will focus on mobility, stretching, ROM, .     Total Treatment Billable Duration:  45 minutes  PT Patient Time In/Time Out  Time In: 1170  Time Out: CHADD [place]    Future Appointments   Date Time Provider Yinka Verma   11/19/2020 10:15 AM Jose Conner DPT [facility]   11/24/2020 10:15 AM Jose Conner DPT [facility]     Visit Approval Visit # Therapist initials Date A NS Cx Comments    1   [x]  [] [] Initial evaluation       [] [] []     5  11/12 [x] [] []     6  11/17 [x] [] []        [] [] []        [] [] []        [] [] []        [] [] []        [] [] []        [] [] []        [] [] []        [] [] []        [] [] []        [] [] []        [] [] []        [] [] []        [] [] []        [] [] []

## 2020-11-19 ENCOUNTER — HOSPITAL ENCOUNTER (OUTPATIENT)
Dept: PHYSICAL THERAPY | Age: 30
Discharge: HOME | End: 2020-11-19

## 2020-11-19 PROCEDURE — 97016 VASOPNEUMATIC DEVICE THERAPY: CPT

## 2020-11-19 PROCEDURE — 97110 THERAPEUTIC EXERCISES: CPT

## 2020-11-19 PROCEDURE — 97140 MANUAL THERAPY 1/> REGIONS: CPT

## 2020-11-19 NOTE — PROGRESS NOTES
Jackie Jainre  : 1990  Primary:   Secondary:  [address] at [facility]  [address], [address], [place], [address] [place] [address]  [phone]   [phone]        OUTPATIENT PHYSICAL THERAPY: Daily Treatment Note 2020  Visit Count:  6    L Pat tend repair    Pre-treatment Symptoms/Complaints:  Pt reports doing pretty well  Pain: Initial:   0/10 Post Session:  0/10   Medications Last Reviewed:  2020  Updated Objective Findings:  using 1 crutch at arrival  TREATMENT:     THERAPEUTIC ACTIVITY: ( 0 minutes): Therapeutic activities per grid below to improve mobility, strength, balance and coordination. Required maximal verbal and manual cues to promote static and dynamic balance in standing, promote coordination of bilateral, lower extremity(s) and promote motor control of bilateral, lower extremity(s). THERAPEUTIC EXERCISE: (15 minutes):  Exercises per grid below to improve mobility, strength, balance and coordination. Required maximal verbal cues to promote proper body alignment, promote proper body posture and promote proper body breathing techniques. Progressed range and complexity of movement as indicated. NEUROMUSCULAR RE-EDUCATION: (0 minutes):  Exercise/activities per grid below to improve balance, coordination, kinesthetic sense, posture and proprioception. Required moderate verbal cues to promote static and dynamic balance in standing, promote coordination of bilateral, lower extremity(s) and promote motor control of bilateral, lower extremity(s). MANUAL THERAPY: (25 minutes): Joint mobilization, Soft tissue mobilization and Manipulation was utilized and necessary because of the patient's restricted joint motion, painful spasm, loss of articular motion and restricted motion of soft tissue.     Date:  11/3/20 Date:  11/10/20 Date:  20 Date   20 Date  20   Activity/Exercise Parameters Parameters Parameters     STM Patella mobs, quad, tend `Patella mobs, quad, tend Patella mobs, quad, tend. MWM pat with QS Patella mobs, quad, tend. In supine and sitting Patella mobs, quad, tend. In  sitting   PROM Knee flex man and dangling Knee flex man and dangling  Dangle, w pt STM on quad Dangle with quad STM and DTM on patella tend   vasocomp x15min x15min x15 x15min x15   scifit  6min  Seat at 21  Brace unlocked  Knee flexion to tolerance 10min, seat at  10min, seat at 19 Nu step x10min   Quad sets  X20, bilat X20, MWM x20    SAQ    w sequencing, x15 w sequencing,            vasocompression  15min on L knee    [facility] Portal  Treatment/Session Summary:    · Response to Treatment:  pt improving . ROM still at [facility], but pt not using R foot for support and today sitting without using foot to initiate dangle. · Communication/Consultation:  None today  · Equipment provided today:  None today  · Recommendations/Intent for next treatment session: Next visit will focus on mobility, stretching, ROM, .     Total Treatment Billable Duration:  45 minutes  PT Patient Time In/Time Out  Time In: 1015  Time Out: 224 [facility], [facility]    Future Appointments   Date Time Provider Yinka Verma   11/24/2020 10:15 AM Cristian Arana DPT [facility]     Visit Approval Visit # Therapist initials Date A NS Cx Comments    1   [x]  [] [] Initial evaluation       [] [] []     5  11/12 [x] [] []     6  11/17 [x] [] []        [] [] []        [] [] []        [] [] []        [] [] []        [] [] []        [] [] []        [] [] []        [] [] []        [] [] []        [] [] []        [] [] []        [] [] []        [] [] []        [] [] []

## 2020-11-24 ENCOUNTER — HOSPITAL ENCOUNTER (OUTPATIENT)
Dept: PHYSICAL THERAPY | Age: 30
Discharge: HOME | End: 2020-11-24

## 2020-11-24 PROCEDURE — 97016 VASOPNEUMATIC DEVICE THERAPY: CPT

## 2020-11-24 PROCEDURE — 97110 THERAPEUTIC EXERCISES: CPT

## 2020-11-24 PROCEDURE — 97530 THERAPEUTIC ACTIVITIES: CPT

## 2020-11-24 NOTE — PROGRESS NOTES
Yosef Daily  : 1990  Primary:   Secondary:  [address] [facility]  at [address] [facility], [address], [place], [address] [facility]  [phone]   [phone]        OUTPATIENT PHYSICAL THERAPY: Daily Treatment Note 2020  Visit Count:  7    L Pat tend repair    Pre-treatment Symptoms/Complaints:  Pt reports he is working on it more with walking and moving it more. Pain: Initial:   0/10 Post Session:  0/10   Medications Last Reviewed:  2020  Updated Objective Findings:  using 1 crutch at arrival  TREATMENT:     THERAPEUTIC ACTIVITY: ( 25minutes): Therapeutic activities per grid below to improve mobility, strength, balance and coordination. Required maximal verbal and manual cues to promote static and dynamic balance in standing, promote coordination of bilateral, lower extremity(s) and promote motor control of bilateral, lower extremity(s). THERAPEUTIC EXERCISE: (15 minutes):  Exercises per grid below to improve mobility, strength, balance and coordination. Required maximal verbal cues to promote proper body alignment, promote proper body posture and promote proper body breathing techniques. Progressed range and complexity of movement as indicated. NEUROMUSCULAR RE-EDUCATION: (0 minutes):  Exercise/activities per grid below to improve balance, coordination, kinesthetic sense, posture and proprioception. Required moderate verbal cues to promote static and dynamic balance in standing, promote coordination of bilateral, lower extremity(s) and promote motor control of bilateral, lower extremity(s). MANUAL THERAPY: ( minutes): Joint mobilization, Soft tissue mobilization and Manipulation was utilized and necessary because of the patient's restricted joint motion, painful spasm, loss of articular motion and restricted motion of soft tissue.     Date:  11/3/20 Date:  11/10/20 Date:  20 Date   20 Date  20 Date  20   Activity/Exercise Parameters Parameters Parameters      STM Patella mobs, quad, tend `Patella mobs, quad, tend Patella mobs, quad, tend. MWM pat with QS Patella mobs, quad, tend. In supine and sitting Patella mobs, quad, tend. In  sitting    PROM Knee flex man and dangling Knee flex man and dangling  Dangle, w pt STM on quad Dangle with quad STM and DTM on patella tend Dangle w PROM stretch,    vasocomp x15min x15min x15 x15min x15 x15min   scifit  6min  Seat at 21  Brace unlocked  Knee flexion to tolerance 10min, seat at  10min, seat at 19 Nu step x10min    Quad sets  X20, bilat X20, MWM x20     SAQ    w sequencing, x15 w sequencing,     gait      Fwd, bwd, twirls   vasocompression  15min on L knee    MedBridge Portal  Treatment/Session Summary:    · Response to Treatment:  Emphasis on closed chain exer, toe off/heel rise, ROM brace at 70deg, will graduate to full next  Visit. · Communication/Consultation:  None today  · Equipment provided today:  None today  · Recommendations/Intent for next treatment session: Next visit will focus on mobility, stretching, ROM, . Total Treatment Billable Duration:  45 minutes  PT Patient Time In/Time Out  Time In: 1015  Time Out: 224 [facility], [facility]    No future appointments.   Visit Approval Visit # Therapist initials Date A NS Cx Comments    1   [x]  [] [] Initial evaluation       [] [] []     5  11/12 [x] [] []     6  11/17 [x] [] []        [] [] []        [] [] []        [] [] []        [] [] []        [] [] []        [] [] []        [] [] []        [] [] []        [] [] []        [] [] []        [] [] []        [] [] []        [] [] []        [] [] []

## 2020-12-01 ENCOUNTER — HOSPITAL ENCOUNTER (OUTPATIENT)
Dept: PHYSICAL THERAPY | Age: 30
Discharge: HOME | End: 2020-12-01

## 2020-12-01 PROCEDURE — 97530 THERAPEUTIC ACTIVITIES: CPT

## 2020-12-01 PROCEDURE — 97140 MANUAL THERAPY 1/> REGIONS: CPT

## 2020-12-01 NOTE — THERAPY RECERTIFICATION
Jackie Nicolas : 1990 Primary:  
Secondary:  [facility] [address], [address], [place], [facility] [phone]   [phone] OUTPATIENT PHYSICAL THERAPY: Daily Treatment Note 2020 Visit Count:  8 L Pat tend repair Pre-treatment Symptoms/Complaints:  Pt reports he has been walking around w/ brace, doing well, noticed when walking up stairs he lifts foot higher than step. Fear is decreasing  Just feeling weak,  
Pain: Initial:   0/10 Post Session:  0/10 Medications Last Reviewed:  2020 Updated Objective Findings:  Re eval 
TREATMENT:  
 
THERAPEUTIC ACTIVITY: ( 40minutes): Therapeutic activities per grid below to improve mobility, strength, balance and coordination. Required maximal verbal and manual cues to promote static and dynamic balance in standing, promote coordination of bilateral, lower extremity(s) and promote motor control of bilateral, lower extremity(s). THERAPEUTIC EXERCISE: ( minutes):  Exercises per grid below to improve mobility, strength, balance and coordination. Required maximal verbal cues to promote proper body alignment, promote proper body posture and promote proper body breathing techniques. Progressed range and complexity of movement as indicated. NEUROMUSCULAR RE-EDUCATION: (0 minutes):  Exercise/activities per grid below to improve balance, coordination, kinesthetic sense, posture and proprioception. Required moderate verbal cues to promote static and dynamic balance in standing, promote coordination of bilateral, lower extremity(s) and promote motor control of bilateral, lower extremity(s). MANUAL THERAPY: ( 8minutes): Joint mobilization, Soft tissue mobilization and Manipulation was utilized and necessary because of the patient's restricted joint motion, painful spasm, loss of articular motion and restricted motion of soft tissue. Date: 
11/3/20 Date: 
11/10/20 Date: 
20 Date 11/17/20 Date 11/19/20 Date 
11/24/20 DATE 
12/1/20 Activity/Exercise Parameters Parameters Parameters STM Patella mobs, quad, tend `Patella mobs, quad, tend Patella mobs, quad, tend. MWM pat with QS Patella mobs, quad, tend. In supine and sitting Patella mobs, quad, tend. In  sitting  Patella mobs in 80deg PROM Knee flex man and dangling Knee flex man and dangling  Dangle, w pt STM on quad Dangle with quad STM and DTM on patella tend Dangle w PROM stretch,    
vasocomp x15min x15min x15 x15min x15 x15min   
scifit  6min Seat at 21 Brace unlocked Knee flexion to tolerance 10min, seat at  10min, seat at 19 Nu step x10min  Nu step x10min Quad sets  X20, bilat X20, MWM x20   X20, w SLR  
SAQ    w sequencing, x15 w sequencing,     
gait      Fwd, bwd, twirls Fwd to improve gt mechancis Wobble board       A/p, s/s, x30, finger tip guard SLS       w rot x12  
vasocompression  15min on L knee GOALS: (Goals have been discussed and agreed upon with patient.) Short term GOALS: Time Frame: 6 weeks                                                                                                                                      
1.  Pt will be compliant and independent with advanced HEP in order to increase post pat tendon repair mobility and strength of the L LE. Yamila Shankar MET 12/1/20 2. Pt will increase score on the LEFS to 30/80 in order to demonstrate improved functional mobility and quality of life. GOAL MET 12/1/20 3.  Pt will report standing for > 10 minutes with minimal to no increase in pain in order to be able to stand for prolonged periods as needed to perform standing for job. Gamaliel Gutiérrez MET 12/1/20 
  
  
DISCHARGE GOALS: Time Frame: 12 weeks                                                                                                                                      
 1.  Pt will be compliant and independent with advanced HEP in order to increase post pat tendon repair mobility and strength of the L LE.   GOAL MET 12/1/20 2. Pt will increase score on the LEFS to 55/80 in order to demonstrate improved functional mobility and quality of life. 3. Pt will report standing for > 30 minutes with minimal to no increase in pain in order to be able to stand for prolonged periods as needed to perform standing for job.     
4. Patient will be able to  tolerate 8hr shift at work if cleared by MD. 
  
OUTCOME MEASURE:  
Tool Used: Lower Extremity Functional Scale (LEFS) Score:  Initial: 18/80 Most Recent: 37/80 (Date: 12/1/20 ) Interpretation of Score: 20 questions each scored on a 5 point scale with 0 representing \"extreme difficulty or unable to perform\" and 4 representing \"no difficulty\". The lower the score, the greater the functional disability. 80/80 represents no disability. Minimal detectable change is 9 points. Range of Motion              
Joint: Date:  10/29/2020   Date: 12/1/20   Date:    
  Right Left Right Left Right Left HIP              
Flex 60  60  AAROM          
Abduction 40 40          
KNEE              
Flexion  120 0  120  80      
Extension  0 0    0      
ANKLE              
DF WNL WNL          
               
               
STRENGTH                
Joint: Date:   10/29/2020   Date:12/1/20   Date:      
  Right Left Right Left Right Left Ankle DF 5/5 5/5            
Knee extension 5/5 NT    2-/5        
Hip flexion 5/5 1/5    4-/5        
                 
girth   41cm    41cm        
 
 
 
MedBridge Portal 
Treatment/Session Summary: · Response to Treatment:  Emphasis on lurch walking, unlocked brace, pt improved gait when walking quicker eliminating lurch, but still with guarded flex. Will address knee flex during gait next visit. · Communication/Consultation:  None today · Equipment provided today:  None today · Recommendations/Intent for next treatment session: Next visit will focus on mobility, stretching, ROM, . Total Treatment Billable Duration:  50 minutes Ramón Cosme DPT Future Appointments Date Time Provider Yinka Luci 12/1/2020 11:00 AM Deborah Darby, DPT [facility]  
12/3/2020 10:15 AM Deborah Darby, DPT [facility]  
12/8/2020 10:15 AM Deborah Darby, DPT [facility]  
12/10/2020 10:15 AM Deborah Darby, DPT [facility]  
12/15/2020 10:15 AM Deborah Darby, DPT [facility]  
12/17/2020 10:15 AM Deborah Darby, DPT [facility] Emmie Mendoza Visit Approval Visit # Therapist initials Date A NS Cx Comments 1   [x]  [] [] Initial evaluation  
    [] [] []   
 5  11/12 [x] [] []   
 6  11/17 [x] [] []   
 7  11/24 [x] [] []   
 8  12/1/20 [x] [] []   
    [] [] []   

## 2020-12-03 ENCOUNTER — HOSPITAL ENCOUNTER (OUTPATIENT)
Dept: PHYSICAL THERAPY | Age: 30
Discharge: HOME | End: 2020-12-03

## 2020-12-03 PROCEDURE — 97110 THERAPEUTIC EXERCISES: CPT

## 2020-12-03 PROCEDURE — 97016 VASOPNEUMATIC DEVICE THERAPY: CPT

## 2020-12-03 PROCEDURE — 97530 THERAPEUTIC ACTIVITIES: CPT

## 2020-12-03 NOTE — PROGRESS NOTES
Jaxon Doherty  : 1990  Primary:   Secondary:  [address] [place]  at [facility]  [place], [address], [place], [facility] [address] [facility]  [phone]   [phone]        OUTPATIENT PHYSICAL THERAPY: Daily Treatment Note 12/3/2020  Visit Count:  9    L Pat tend repair    Pre-treatment Symptoms/Complaints:  Pt reports he is sleeping without brace and able to sleep. He says the brace is now cumbersome between legs. Pain: Initial:   0/10 Post Session:  0/10   Medications Last Reviewed:  12/3/2020  Updated Objective Findings:  Not using crutch at arrival  TREATMENT:     THERAPEUTIC ACTIVITY: ( 25minutes): Therapeutic activities per grid below to improve mobility, strength, balance and coordination. Required maximal verbal and manual cues to promote static and dynamic balance in standing, promote coordination of bilateral, lower extremity(s) and promote motor control of bilateral, lower extremity(s). THERAPEUTIC EXERCISE: (15 minutes):  Exercises per grid below to improve mobility, strength, balance and coordination. Required maximal verbal cues to promote proper body alignment, promote proper body posture and promote proper body breathing techniques. Progressed range and complexity of movement as indicated. NEUROMUSCULAR RE-EDUCATION: (0 minutes):  Exercise/activities per grid below to improve balance, coordination, kinesthetic sense, posture and proprioception. Required moderate verbal cues to promote static and dynamic balance in standing, promote coordination of bilateral, lower extremity(s) and promote motor control of bilateral, lower extremity(s). MANUAL THERAPY: ( minutes): Joint mobilization, Soft tissue mobilization and Manipulation was utilized and necessary because of the patient's restricted joint motion, painful spasm, loss of articular motion and restricted motion of soft tissue.     Date  20 Date:  20 Date:  12/3/20 Date    Date   Date     Activity/Exercise Parameters Parameters Parameters      STM Patella mobs, quad, tend. In  sitting        PROM Dangle with quad STM and DTM on patella tend Dangle w PROM stretch,        AROM   Max flex, ext in sitting x12      vasocomp x15 x15min x15      scifit Nu step x10min Nu step x10min Nu step x10      Quad sets   w SLR      SLR   w abd,ER, x15 ea      SAQ w sequencing        gait  Fwd, bwd twirls Fwd, bwd, s/s, twirls,       Calf stretch   [id number]      balance   Wobble board, a/p, s/s, 1 finger tip stability               vasocompression  15min on L knee    [employer] Portal  Treatment/Session Summary:    · Response to Treatment:  Pt improving overall, able to attain 80deg flex. .   · Communication/Consultation:  None today  · Equipment provided today:  None today  · Recommendations/Intent for next treatment session: Next visit will focus on mobility, stretching, ROM, .     Total Treatment Billable Duration:  45 minutes  PT Patient Time In/Time Out  Time In: 1015  Time Out: 224 [facility], T    Future Appointments   Date Time Provider Yinka Verma   12/8/2020 10:15 AM Joseph Padilla DPT [facility]   12/10/2020 10:15 AM CHADD Taylor SURYA   12/15/2020 10:15 AM CHADD Taylor [facility]   12/17/2020 10:15 AM CHADD Taylor SFSURYA     Visit Approval Visit # Therapist initials Date A NS Cx Comments    1   [x]  [] [] Initial evaluation       [] [] []     5  11/12 [x] [] []     6  11/17 [x] [] []     7  11/24 [x] [] []     8  12/1/20 [x] [] [] reeval    9  12/3/20 [x] [] []        [] [] []        [] [] []        [] [] []        [] [] []        [] [] []        [] [] []        [] [] []        [] [] []        [] [] []        [] [] []        [] [] []

## 2020-12-08 ENCOUNTER — HOSPITAL ENCOUNTER (OUTPATIENT)
Dept: PHYSICAL THERAPY | Age: 30
Discharge: HOME | End: 2020-12-08

## 2020-12-08 PROCEDURE — 97530 THERAPEUTIC ACTIVITIES: CPT

## 2020-12-08 PROCEDURE — 97016 VASOPNEUMATIC DEVICE THERAPY: CPT

## 2020-12-08 PROCEDURE — 97110 THERAPEUTIC EXERCISES: CPT

## 2020-12-08 NOTE — PROGRESS NOTES
Jazzluis Salazar  : 1990  Primary:   Secondary:  [address] [place]  at [address] [facility] [address], [address], [place], [address] [facility]  [phone]   [phone]        OUTPATIENT PHYSICAL THERAPY: Daily Treatment Note 2020  Visit Count:  10    L Pat tend repair    Pre-treatment Symptoms/Complaints:  Pt reports he is walking regularly, trying to bend knee. Has MD appt tomorrow. Pain: Initial:   0/10 Post Session:  0/10   Medications Last Reviewed:  2020  Updated Objective Findings:  Not using crutch at arrival  TREATMENT:     THERAPEUTIC ACTIVITY: ( 25minutes): Therapeutic activities per grid below to improve mobility, strength, balance and coordination. Required maximal verbal and manual cues to promote static and dynamic balance in standing, promote coordination of bilateral, lower extremity(s) and promote motor control of bilateral, lower extremity(s). THERAPEUTIC EXERCISE: (15 minutes):  Exercises per grid below to improve mobility, strength, balance and coordination. Required maximal verbal cues to promote proper body alignment, promote proper body posture and promote proper body breathing techniques. Progressed range and complexity of movement as indicated. NEUROMUSCULAR RE-EDUCATION: (0 minutes):  Exercise/activities per grid below to improve balance, coordination, kinesthetic sense, posture and proprioception. Required moderate verbal cues to promote static and dynamic balance in standing, promote coordination of bilateral, lower extremity(s) and promote motor control of bilateral, lower extremity(s). MANUAL THERAPY: ( minutes): Joint mobilization, Soft tissue mobilization and Manipulation was utilized and necessary because of the patient's restricted joint motion, painful spasm, loss of articular motion and restricted motion of soft tissue.     Date  20 Date:  20 Date:  12/3/20 Date   20 Date   Date     Activity/Exercise Parameters Parameters Parameters      STM Patella mobs, quad, tend. In  sitting        PROM Dangle with quad STM and DTM on patella tend Dangle w PROM stretch,        AROM   Max flex, ext in sitting x12      vasocomp x15 x15min x15      scifit Nu step x10min Nu step x10min Nu step x10 Big nustep x10, seat at 5. Quad sets   w SLR      SLR   w abd,ER, x15 ea      SAQ w sequencing        gait  Fwd, bwd twirls Fwd, bwd, s/s, twirls,  Fwd, bwd, regular speed, twirls, quick spins     Calf stretch   [id number]     balance   Wobble board, a/p, s/s, 1 finger tip stability Wobble board, a/p, s/s, no support, airex-tandem-rot              vasocompression  15min on L knee    MedBridge Portal  Treatment/Session Summary:    · Response to Treatment:  Pt improving overall, able to attain 97deg flex. with AROM/PROM flex. Improvement with natural walk. · Communication/Consultation:  None today  · Equipment provided today:  None today  · Recommendations/Intent for next treatment session: Next visit will focus on mobility, stretching, ROM, .     Total Treatment Billable Duration:  45 minutes  PT Patient Time In/Time Out  Time In: 1015  Time Out: 224 [facility], CHADD    Future Appointments   Date Time Provider Yinka Verma   12/10/2020 10:15 AM Mel Workman DPT [facility]   12/15/2020 10:15 AM CHADD Miranda [facility]   12/17/2020 10:15 AM CHADD Miranda SURYA     Visit Approval Visit # Therapist initials Date A NS Cx Comments    1   [x]  [] [] Initial evaluation       [] [] []     5  11/12 [x] [] []     6  11/17 [x] [] []     7  11/24 [x] [] []     8  12/1/20 [x] [] [] reeval    9  12/3/20 [x] [] []     10  12/8/20 [x] [] []        [] [] []        [] [] []        [] [] []        [] [] []        [] [] []        [] [] []        [] [] []        [] [] []        [] [] []        [] [] []

## 2020-12-10 ENCOUNTER — HOSPITAL ENCOUNTER (OUTPATIENT)
Dept: PHYSICAL THERAPY | Age: 30
Discharge: HOME | End: 2020-12-10

## 2020-12-10 PROCEDURE — 97016 VASOPNEUMATIC DEVICE THERAPY: CPT

## 2020-12-10 PROCEDURE — 97530 THERAPEUTIC ACTIVITIES: CPT

## 2020-12-10 NOTE — PROGRESS NOTES
Ben Velazquez  : 1990  Primary:   Secondary:  [address] [place]  at [address] [facility]  [address] [facility], [address] [place] [address] Drive, [place], [address] [facility]  [phone]   [phone]        OUTPATIENT PHYSICAL THERAPY: Daily Treatment Note 12/10/2020  Visit Count:  11    L Pat tend repair    Pre-treatment Symptoms/Complaints:  Pt reports he saw MD and report was that he is progressing. Flex needs a little more attn. Pain: Initial:   0/10 Post Session:  0/10   Medications Last Reviewed:  12/10/2020  Updated Objective Findings:  Not using brace any longer. TREATMENT:     THERAPEUTIC ACTIVITY: ( 40minutes): Therapeutic activities per grid below to improve mobility, strength, balance and coordination. Required maximal verbal and manual cues to promote static and dynamic balance in standing, promote coordination of bilateral, lower extremity(s) and promote motor control of bilateral, lower extremity(s). THERAPEUTIC EXERCISE: ( minutes):  Exercises per grid below to improve mobility, strength, balance and coordination. Required maximal verbal cues to promote proper body alignment, promote proper body posture and promote proper body breathing techniques. Progressed range and complexity of movement as indicated. NEUROMUSCULAR RE-EDUCATION: (0 minutes):  Exercise/activities per grid below to improve balance, coordination, kinesthetic sense, posture and proprioception. Required moderate verbal cues to promote static and dynamic balance in standing, promote coordination of bilateral, lower extremity(s) and promote motor control of bilateral, lower extremity(s). MANUAL THERAPY: ( minutes): Joint mobilization, Soft tissue mobilization and Manipulation was utilized and necessary because of the patient's restricted joint motion, painful spasm, loss of articular motion and restricted motion of soft tissue.     Date  20 Date:  20 Date:  12/3/20 Date   20 Date  12/10/20 Date     Activity/Exercise Parameters Parameters Parameters      STM Patella mobs, quad, tend. In  sitting        PROM Dangle with quad STM and DTM on patella tend Dangle w PROM stretch,        AROM   Max flex, ext in sitting x12  Sit -stand trnf, chair, 15in box, x10    vasocomp x15 x15min x15 x15 x15    scifit Nu step x10min Nu step x10min Nu step x10 Big nustep x10, seat at 5. Big nustep x10, seat at 5. Quad sets   w SLR      SLR   w abd,ER, x15 ea      SAQ w sequencing        gait  Fwd, bwd twirls Fwd, bwd, s/s, twirls,  Fwd, bwd, regular speed, twirls, quick spins Fwd, defense drill-2laps    Calf stretch   [id number]    balance   Wobble board, a/p, s/s, 1 finger tip stability Wobble board, a/p, s/s, no support, airex-tandem-rot SLS-w rot and airex x10             vasocompression  15min on L knee    MedBridge Portal  Treatment/Session Summary:    · Response to Treatment:  Pt improving overall, able to attain 100deg flex. with AAROM and 97deg with AROM  flex. Introduced using sit-stand for flex on lower surface. · Communication/Consultation:  None today  · Equipment provided today:  None today  · Recommendations/Intent for next treatment session: Next visit will focus on mobility, stretching, ROM, .     Total Treatment Billable Duration:  55 minutes  PT Patient Time In/Time Out  Time In: 1015  Time Out: 224 [facility], [facility]    Future Appointments   Date Time Provider Yinka Verma   12/15/2020 10:15 AM Brandie Patterson DPT [facility]   12/17/2020 10:15 AM Brandie Patterson DPT [facility]     Visit Approval Visit # Therapist initials Date A NS Cx Comments    1   [x]  [] [] Initial evaluation       [] [] []     5  11/12 [x] [] []     6  11/17 [x] [] []     7  11/24 [x] [] []     8  12/1/20 [x] [] [] reeval    9  12/3/20 [x] [] []     10 jh 12/8/20 [x] [] []     11 jh 12/10/20 [x] [] []        [] [] []        [] [] []        [] [] []        [] [] []        [] [] []        [] [] []        [] [] [] [] [] []        [] [] []

## 2020-12-15 ENCOUNTER — HOSPITAL ENCOUNTER (OUTPATIENT)
Dept: PHYSICAL THERAPY | Age: 30
Discharge: HOME | End: 2020-12-15

## 2020-12-15 PROCEDURE — 97530 THERAPEUTIC ACTIVITIES: CPT

## 2020-12-15 NOTE — PROGRESS NOTES
Barry Pisano  : 1990  Primary:   Secondary:  [address]  at [facility]  [address], [address], [place], [facility]  [phone]   [phone]        OUTPATIENT PHYSICAL THERAPY: Daily Treatment Note 12/15/2020  Visit Count:  12    L Pat tend repair    Pre-treatment Symptoms/Complaints:  Pt reports he is walking more. He went down steps without thinking and with no problems. Pain: Initial:   0/10 Post Session:  0/10   Medications Last Reviewed:  12/15/2020  Updated Objective Findings:  Not using brace any longer. TREATMENT:     THERAPEUTIC ACTIVITY: ( 55minutes): Therapeutic activities per grid below to improve mobility, strength, balance and coordination. Required maximal verbal and manual cues to promote static and dynamic balance in standing, promote coordination of bilateral, lower extremity(s) and promote motor control of bilateral, lower extremity(s). THERAPEUTIC EXERCISE: ( minutes):  Exercises per grid below to improve mobility, strength, balance and coordination. Required maximal verbal cues to promote proper body alignment, promote proper body posture and promote proper body breathing techniques. Progressed range and complexity of movement as indicated. NEUROMUSCULAR RE-EDUCATION: (0 minutes):  Exercise/activities per grid below to improve balance, coordination, kinesthetic sense, posture and proprioception. Required moderate verbal cues to promote static and dynamic balance in standing, promote coordination of bilateral, lower extremity(s) and promote motor control of bilateral, lower extremity(s). MANUAL THERAPY: ( minutes): Joint mobilization, Soft tissue mobilization and Manipulation was utilized and necessary because of the patient's restricted joint motion, painful spasm, loss of articular motion and restricted motion of soft tissue.     Date  20 Date:  20 Date:  12/3/20 Date   20 Date  12/10/20 Date  12/15/20 Activity/Exercise Parameters Parameters Parameters      STM Patella mobs, quad, tend. In  sitting        PROM Dangle with quad STM and DTM on patella tend Dangle w PROM stretch,        AROM   Max flex, ext in sitting x12  Sit -stand trnf, chair, 15in box, x10    vasocomp x15 x15min x15 x15 x15    scifit Nu step x10min Nu step x10min Nu step x10 Big nustep x10, seat at 5. Big nustep x10, seat at 5. Big nustep x10, seat at 5. Quad sets   w SLR      SLR   w abd,ER, x15 ea      SAQ w sequencing        gait  Fwd, bwd twirls Fwd, bwd, s/s, twirls,  Fwd, bwd, regular speed, twirls, quick spins Fwd, defense drill-2laps [place], slope, uneven surface   Calf stretch   [id number]   balance   Wobble board, a/p, s/s, 1 finger tip stability Wobble board, a/p, s/s, no support, airex-tandem-rot SLS-w rot and airex x10    Sit- stand      15in box, x12   Hip hike      L>R, glut, x20   TKE      4in step,    vasocompression   on L knee    [facility] Portal  Treatment/Session Summary:    · Response to Treatment:  Pt conts to improv3 , able to attain 105deg flex. with AROM Comfortable on slopes and traversing, main limitation is eccentric control. · Communication/Consultation:  None today  · Equipment provided today:  None today  · Recommendations/Intent for next treatment session: Next visit will focus on mobility, stretching, ROM, .     Total Treatment Billable Duration:  55 minutes  PT Patient Time In/Time Out  Time In: 1010  Time Out: Shyanne Chiang DPT    Future Appointments   Date Time Provider Yinka Verma   12/15/2020 10:15 AM Gabriela Emmanuel DPT [facility]   12/17/2020 10:15 AM Gabriela Emmanuel DPT [facility]     Visit Approval Visit # Therapist initials Date A NS Cx Comments    1   [x]  [] [] Initial evaluation       [] [] []     5  11/12 [x] [] []     6  11/17 [x] [] []     7 jh 11/24 [x] [] []     8 jh 12/1/20 [x] [] [] reeval    9 jh 12/3/20 [x] [] []     10 jh 12/8/20 [x] [] [] 11 jh 12/10/20 [x] [] []     12 jh 12/15/20 [x] [] []        [] [] []        [] [] []        [] [] []        [] [] []        [] [] []        [] [] []        [] [] []        [] [] []

## 2020-12-17 ENCOUNTER — HOSPITAL ENCOUNTER (OUTPATIENT)
Dept: PHYSICAL THERAPY | Age: 30
Discharge: HOME | End: 2020-12-17

## 2020-12-17 PROCEDURE — 97016 VASOPNEUMATIC DEVICE THERAPY: CPT

## 2020-12-17 PROCEDURE — 97530 THERAPEUTIC ACTIVITIES: CPT

## 2020-12-17 NOTE — PROGRESS NOTES
Samy Aranda  : 1990  Primary:   Secondary:  [address] at [facility]  [address], [address], [place], [facility]  [phone]   [phone]        OUTPATIENT PHYSICAL THERAPY: Daily Treatment Note 2020  Visit Count:  13    L Pat tend repair    Pre-treatment Symptoms/Complaints:  Pt reports he conts to work on Protalex and walking at home. Pain: Initial:   0/10 Post Session:  0/10   Medications Last Reviewed:  2020  Updated Objective Findings:  Not using brace any longer. TREATMENT:     THERAPEUTIC ACTIVITY: ( 45minutes): Therapeutic activities per grid below to improve mobility, strength, balance and coordination. Required maximal verbal and manual cues to promote static and dynamic balance in standing, promote coordination of bilateral, lower extremity(s) and promote motor control of bilateral, lower extremity(s). THERAPEUTIC EXERCISE: ( minutes):  Exercises per grid below to improve mobility, strength, balance and coordination. Required maximal verbal cues to promote proper body alignment, promote proper body posture and promote proper body breathing techniques. Progressed range and complexity of movement as indicated. NEUROMUSCULAR RE-EDUCATION: (0 minutes):  Exercise/activities per grid below to improve balance, coordination, kinesthetic sense, posture and proprioception. Required moderate verbal cues to promote static and dynamic balance in standing, promote coordination of bilateral, lower extremity(s) and promote motor control of bilateral, lower extremity(s). MANUAL THERAPY: ( minutes): Joint mobilization, Soft tissue mobilization and Manipulation was utilized and necessary because of the patient's restricted joint motion, painful spasm, loss of articular motion and restricted motion of soft tissue.     Date  20 Date:  20 Date:  12/3/20 Date   20 Date  12/10/20 Date  12/15/20 Date  20   Activity/Exercise Parameters Parameters Parameters       STM Patella mobs, quad, tend. In  sitting         PROM Dangle with quad STM and DTM on patella tend Dangle w PROM stretch,         AROM   Max flex, ext in sitting x12  Sit -stand trnf, chair, 15in box, x10     vasocomp x15 x15min x15 x15 x15  x15min   scifit Nu step x10min Nu step x10min Nu step x10 Big nustep x10, seat at 5. Big nustep x10, seat at 5. Big nustep x10, seat at 5. nustep x10, seat at 5. Quad sets   w SLR       SLR   w abd,ER, x15 ea       SAQ w sequencing         gait  Fwd, bwd twirls Fwd, bwd, s/s, twirls,  Fwd, bwd, regular speed, twirls, quick spins Fwd, defense drill-2laps [place], slope, uneven surface Fwd, quick, stairs,    Calf stretch   [id number] 2o39nzk [id number]    balance   Wobble board, a/p, s/s, 1 finger tip stability Wobble board, a/p, s/s, no support, airex-tandem-rot SLS-w rot and airex x10 SLS-w rot and airex x10 SLS-w rot and airex x10   Sit- stand      15in box, x12 15in x20 w ft pressure   Hip hike      L>R, glut, x20 L>R, glut, x20   TKE      4in step,  4in step x10   vasocompression 15min  on L knee    [facility]  Treatment/Session Summary:    · Response to Treatment:  Pt conts to improve , able to attain 105deg flex. with AROM emphasis on eccentric control on steps, on sitting,   · Communication/Consultation:  None today  · Equipment provided today:  None today  · Recommendations/Intent for next treatment session: Next visit will focus on mobility, stretching, ROM, . Total Treatment Billable Duration:  55 minutes  PT Patient Time In/Time Out  Time In: 1015  Time Out: [facility], DPT    No future appointments.   Visit Approval Visit # Therapist initials Date A NS Cx Comments    1   [x]  [] [] Initial evaluation       [] [] []     5  11/12 [x] [] []     6  11/17 [x] [] []     7 jh 11/24 [x] [] []     8 jh 12/1/20 [x] [] [] reeval    9 jh 12/3/20 [x] [] []     10 jh 12/8/20 [x] [] []     11 jh 12/10/20 [x] [] [] 12 jh 12/15/20 [x] [] []     13 jh 12/17/20 [x] [] []        [] [] []        [] [] []        [] [] []        [] [] []        [] [] []        [] [] []        [] [] []

## 2020-12-22 ENCOUNTER — HOSPITAL ENCOUNTER (OUTPATIENT)
Dept: PHYSICAL THERAPY | Age: 30
Discharge: HOME | End: 2020-12-22

## 2020-12-22 PROCEDURE — 97530 THERAPEUTIC ACTIVITIES: CPT

## 2020-12-22 NOTE — PROGRESS NOTES
Sara Pricemond  : 1990  Primary:   Secondary:  [address] at [facility]  [address], [address], [place], [address] [place] [address]  [phone]   [phone]        OUTPATIENT PHYSICAL THERAPY: Daily Treatment Note 2020  Visit Count:  14    L Pat tend repair    Pre-treatment Symptoms/Complaints:  Pt reports he is thinking about performing job that requires lifting 50-100lbs. Pain: Initial:   0/10 Post Session:  0/10   Medications Last Reviewed:  2020  Updated Objective Findings:  Not using brace any longer. TREATMENT:     THERAPEUTIC ACTIVITY: ( 45minutes): Therapeutic activities per grid below to improve mobility, strength, balance and coordination. Required maximal verbal and manual cues to promote static and dynamic balance in standing, promote coordination of bilateral, lower extremity(s) and promote motor control of bilateral, lower extremity(s). THERAPEUTIC EXERCISE: ( minutes):  Exercises per grid below to improve mobility, strength, balance and coordination. Required maximal verbal cues to promote proper body alignment, promote proper body posture and promote proper body breathing techniques. Progressed range and complexity of movement as indicated. NEUROMUSCULAR RE-EDUCATION: (0 minutes):  Exercise/activities per grid below to improve balance, coordination, kinesthetic sense, posture and proprioception. Required moderate verbal cues to promote static and dynamic balance in standing, promote coordination of bilateral, lower extremity(s) and promote motor control of bilateral, lower extremity(s). MANUAL THERAPY: ( minutes): Joint mobilization, Soft tissue mobilization and Manipulation was utilized and necessary because of the patient's restricted joint motion, painful spasm, loss of articular motion and restricted motion of soft tissue.     Date  20 Date:  20 Date:  12/3/20 Date   20 Date  12/10/20 Date  12/15/20 Date  20 Date  12/22/20   Activity/Exercise Parameters Parameters Parameters        STM Patella mobs, quad, tend. In  sitting          PROM Dangle with quad STM and DTM on patella tend Dangle w PROM stretch,          AROM   Max flex, ext in sitting x12  Sit -stand trnf, chair, 15in box, x10      vasocomp x15 x15min x15 x15 x15  x15min    scifit Nu step x10min Nu step x10min Nu step x10 Big nustep x10, seat at 5. Big nustep x10, seat at 5. Big nustep x10, seat at 5. nustep x10, seat at 5. nustep x10, seat at 4   Quad sets   w SLR        SLR   w abd,ER, x15 ea        SAQ w sequencing          gait  Fwd, bwd twirls Fwd, bwd, s/s, twirls,  Fwd, bwd, regular speed, twirls, quick spins Fwd, defense drill-2laps [place], slope, uneven surface Fwd, quick, stairs,  Fwd, stairs x30   Calf stretch   [id number] [id number] 3y33lzn 3j51imt  [id number]   balance   Wobble board, a/p, s/s, 1 finger tip stability Wobble board, a/p, s/s, no support, airex-tandem-rot SLS-w rot and airex x10 SLS-w rot and airex x10 SLS-w rot and airex x10    Sit- stand      15in box, x12 15in x20 w ft pressure 15/12in x10   Hip hike      L>R, glut, x20 L>R, glut, x20 L>R, glut, x20   TKE      4in step,  4in step x10 4in step x20   Squat drills        [id number] 0f 60ft   vasocompression min  on L knee    [facility] Portal  Treatment/Session Summary:    · Response to Treatment:  Pt conts to improve , able to attain 115deg flex. with AROM emphasis on eccentric control on steps, on sitting,   · Communication/Consultation:  None today  · Equipment provided today:  None today  · Recommendations/Intent for next treatment session: Next visit will focus on mobility, stretching, ROM, . Total Treatment Billable Duration:  45 minutes  PT Patient Time In/Time Out  Time In: 1015  Time Out: [address], [facility]    No future appointments.   Visit Approval Visit # Therapist initials Date A NS Cx Comments    1   [x]  [] [] Initial evaluation       [] [] []     5  11/12 [x] [] []     6 jh 11/17 [x] [] []     7 jh 11/24 [x] [] []     8 jh 12/1/20 [x] [] [] reeval    9 jh 12/3/20 [x] [] []     10 jh 12/8/20 [x] [] []     11 jh 12/10/20 [x] [] []     12 jh 12/15/20 [x] [] []     13 jh 12/17/20 [x] [] []     14 jh 12/22/20 [x] [] []        [] [] []        [] [] []        [] [] []        [] [] []        [] [] []        [] [] []

## 2021-01-05 ENCOUNTER — HOSPITAL ENCOUNTER (OUTPATIENT)
Dept: PHYSICAL THERAPY | Age: 31
Discharge: HOME | End: 2021-01-05

## 2021-01-05 PROCEDURE — 97530 THERAPEUTIC ACTIVITIES: CPT

## 2021-01-05 PROCEDURE — 97110 THERAPEUTIC EXERCISES: CPT

## 2021-01-05 NOTE — PROGRESS NOTES
Carlo Mittal  : 1990  Primary:   Secondary:  [address] at [facility]  [address], [address], [place], [facility]  [phone]   [phone]            OUTPATIENT PHYSICAL THERAPY: Daily Treatment Note 2021  Visit Count:  15    L Pat tend repair    Pre-treatment Symptoms/Complaints:  Pt reports his most difficult task is pulling stuff underneath a rack. (getting on knees and pulling it out)  Pain: Initial:   0/10 no pain only soreness Post Session:  0/10   Medications Last Reviewed:  2021  Updated Objective Findings:  Not using brace any longer. TREATMENT:     THERAPEUTIC ACTIVITY: ( 35 minutes): Therapeutic activities per grid below to improve mobility, strength, balance and coordination. Required maximal verbal and manual cues to promote static and dynamic balance in standing, promote coordination of bilateral, lower extremity(s) and promote motor control of bilateral, lower extremity(s). THERAPEUTIC EXERCISE: (10 minutes):  Exercises per grid below to improve mobility, strength, balance and coordination. Required maximal verbal cues to promote proper body alignment, promote proper body posture and promote proper body breathing techniques. Progressed range and complexity of movement as indicated. NEUROMUSCULAR RE-EDUCATION: (0 minutes):  Exercise/activities per grid below to improve balance, coordination, kinesthetic sense, posture and proprioception. Required moderate verbal cues to promote static and dynamic balance in standing, promote coordination of bilateral, lower extremity(s) and promote motor control of bilateral, lower extremity(s). MANUAL THERAPY: ( minutes): Joint mobilization, Soft tissue mobilization and Manipulation was utilized and necessary because of the patient's restricted joint motion, painful spasm, loss of articular motion and restricted motion of soft tissue.     Date:  12/3/20 Date   20 Date  12/10/20 Date  12/15/20 Date  12/17/20 Date  12/22/20 Date:  1-5-21   Activity/Exercise Parameters         STM          PROM          AROM Max flex, ext in sitting x12  Sit -stand trnf, chair, 15in box, x10       vasocomp x15 x15 x15  x15min     scifit Nu step x10 Big nustep x10, seat at 5. Big nustep x10, seat at 5. Big nustep x10, seat at 5. nustep x10, seat at 5. nustep x10, seat at 4 nustep x 10   Seat at 3   Quad sets w SLR         SLR w abd,ER, x15 ea         SAQ          gait Fwd, bwd, s/s, twirls,  Fwd, bwd, regular speed, twirls, quick spins Fwd, defense drill-2laps [place], slope, uneven surface Fwd, quick, stairs,  Fwd, stairs x30    Calf stretch [id number] [id number] 7e49dwe 6i58zih  [id number] 3 x 30 sec hold    balance Wobble board, a/p, s/s, 1 finger tip stability Wobble board, a/p, s/s, no support, airex-tandem-rot SLS-w rot and airex x10 SLS-w rot and airex x10 SLS-w rot and airex x10     Sit- stand    15in box, x12 15in x20 w ft pressure 15/12in x10    Hip hike    L>R, glut, x20 L>R, glut, x20 L>R, glut, x20    TKE    4in step,  4in step x10 4in step x20    Squat drills      [id number] 0f 60ft With green band at knees for   2 x 30 ft each direction   With green band at ankles for   2 x 30 ft each direction    Pulling / pushing box under plinth    Simulating work task       X 5 in squatted position  X 5 with each knee down    Stool walking       Working on range and strength       Stool at lowest height  60 ft x 2 forward  60 ft x 2 backward    Leg press        30# x 10   40# x 10    vasocompression min  on L knee - declined today 1-5-21    [facility] Portal  Treatment/Session Summary:    · Response to Treatment:  Pt conts to improve. He did well with the new activities. He was able to attain 121 deg flex with AROM while supine. · Communication/Consultation:  None today  · Equipment provided today:  None today  · Recommendations/Intent for next treatment session: Next visit will focus on mobility, stretching, ROM, .     Total Treatment Billable Duration:  45 minutes  PT Patient Time In/Time Out  Time In: 0800  Time Out: [address] Mauri Leger PTA    Future Appointments   Date Time Provider Yinka Luci   1/12/2021  8:00 AM Ethel Valera DPT [facility]     Visit Approval Visit # Therapist initials Date A NS Cx Comments    1   [x]  [] [] Initial evaluation       [] [] []     5  11/12 [x] [] []     6  11/17 [x] [] []     7  11/24 [x] [] []     8  12/1/20 [x] [] [] reeval    9  12/3/20 [x] [] []     10  12/8/20 [x] [] []     11  12/10/20 [x] [] []     12  12/15/20 [x] [] []     13  12/17/20 [x] [] []     14  12/22/20 [x] [] []       12/29/20 [] [x] [x]     15 PDE 1-5-21 [x] [] []        [] [] []        [] [] []        [] [] []        [] [] []

## 2021-01-21 ENCOUNTER — HOSPITAL ENCOUNTER (OUTPATIENT)
Dept: PHYSICAL THERAPY | Age: 31
Discharge: HOME | End: 2021-01-21

## 2021-01-21 PROCEDURE — 97110 THERAPEUTIC EXERCISES: CPT

## 2021-01-21 NOTE — PROGRESS NOTES
Tivis Meter  : 1990  Primary:   Secondary:  [address] at [facility]  [address], [address], [place], [address] [facility]  [phone]   [phone]          OUTPATIENT PHYSICAL THERAPY: Daily Treatment Note 2021  Visit Count:  16    L Pat tend repair    Pre-treatment Symptoms/Complaints:  Pt reports his knee buckled on him 3 times yesterday while descending stairs. He also reports that he needs to squat a little deeper and his back was starting to ache with a long day at work. . (getting on knees and pulling it out)  Pain: Initial:   0/10 no pain only soreness Post Session:  0/10   Medications Last Reviewed:  2021  Updated Objective Findings:  Not using brace any longer. TREATMENT:     THERAPEUTIC ACTIVITY: (  minutes): Therapeutic activities per grid below to improve mobility, strength, balance and coordination. Required maximal verbal and manual cues to promote static and dynamic balance in standing, promote coordination of bilateral, lower extremity(s) and promote motor control of bilateral, lower extremity(s). THERAPEUTIC EXERCISE: (55 minutes):  Exercises per grid below to improve mobility, strength, balance and coordination. Required maximal verbal cues to promote proper body alignment, promote proper body posture and promote proper body breathing techniques. Progressed range and complexity of movement as indicated. NEUROMUSCULAR RE-EDUCATION: (0 minutes):  Exercise/activities per grid below to improve balance, coordination, kinesthetic sense, posture and proprioception. Required moderate verbal cues to promote static and dynamic balance in standing, promote coordination of bilateral, lower extremity(s) and promote motor control of bilateral, lower extremity(s).   MANUAL THERAPY: ( minutes): Joint mobilization, Soft tissue mobilization and Manipulation was utilized and necessary because of the patient's restricted joint motion, painful spasm, loss of articular motion and restricted motion of soft tissue. Date  12/10/20 Date  12/15/20 Date  12/17/20 Date  12/22/20 Date:  1-5-21 Date:  1-   Activity/Exercise         STM         PROM         AROM Sit -stand trnf, chair, 15in box, x10        vasocomp x15  x15min      scifit Big nustep x10, seat at 5.  Big nustep x10, seat at 5. nustep x10, seat at 5. nustep x10, seat at 4 nustep x 10   Seat at 3 10 min  Level 3   Quad sets         SLR         SAQ         gait Fwd, defense drill-[place], slope, uneven surface Fwd, quick, stairs,  Fwd, stairs x30     Calf stretch [id number]  [id number] 3 x 30 sec hold  3 x 30 sec hold    balance SLS-w rot and airex x10 SLS-w rot and airex x10 SLS-w rot and airex x10      Sit- stand  15in box, x12 15in x20 w ft pressure 15/12in x10     Hip hike  L>R, glut, x20 L>R, glut, x20 L>R, glut, x20     TKE  4in step,  4in step x10 4in step x20     Squat drills    [id number] 0f 60ft With green band at knees for   2 x 30 ft each direction   With green band at ankles for   2 x 30 ft each direction  With TRX bands     3 stances   X 15 each way    Pulling / pushing box under plinth    Simulating work task     X 5 in squatted position  X 5 with each knee down     Stool walking       Working on range and strength     Stool at lowest height  60 ft x 2 forward  60 ft x 2 backward     Eccentric - step down       4 inch   Tapping R heel down and back up  And to side   2 x 10 each one    Leg press      40#  X 20    Squat down to 8 inch step       X 10 with min A to    jogging      50 ft x 4 light jogging              ROM               Joint: Date:  10/29/2020   Date: 12/1/20   Date: 1/21/21       Right Left Right Left Right Left   HIP               Flex 60  60  AAROM           Abduction 40 40           KNEE               Flexion  120 0  120  80    121   Extension  0 0    0       ANKLE               DF WNL WNL                                           STRENGTH                   Joint: Date:   10/29/2020   Date:12/1/20   Date:           Right Left Right Left Right Left       Ankle DF 5/5 5/5               Knee extension 5/5 NT    2-/5           Hip flexion 5/5 1/5    4-/5                               girth   41cm    41cm              DISCHARGE GOALS: Time Frame: 12 weeks                                                                                                                                       1.  Pt will be compliant and independent with advanced HEP in order to increase post pat tendon repair mobility and strength of the L LE.   GOAL MET 1/21/21  2. Pt will increase score on the LEFS to 55/80 in order to demonstrate improved functional mobility and quality of life.  GOAL MET 1/21/21  3. Pt will report standing for > 30 minutes with minimal to no increase in pain in order to be able to stand for prolonged periods as needed to perform standing for job. Ivon Box MET 1/21/21  4. Patient will be able to [employer] 8hr shift at work if cleared by MD. Teetee Hanna MET 12/21/21     OUTCOME MEASURE:   Tool Used: Lower Extremity Functional Scale (LEFS)  Score:  Initial: 18/80 Most Recent: 37/80 (Date: 12/1/20 ) Most Recent: 59/80 (Date: 1/21/21 )   Interpretation of Score: 20 questions each scored on a 5 point scale with 0 representing \"extreme difficulty or unable to perform\" and 4 representing \"no difficulty\".  The lower the score, the greater the functional disability. 80/80 represents no disability.  Minimal detectable change is 9 points. [employer] [facility]  Treatment/Session Summary:    · Response to Treatment:  Patient still working on improving strength and endurance in L knee. He did try jogging, but stated it felt weak. Discussed his HEP. Encouraged him to stretch after a long day at work to decrease back pain. Encouraged him to perform some pelvic rotation during squats.     · Communication/Consultation:  None today  · Equipment provided today:  None today  · Recommendations/Intent for next treatment session: will dc pt due to meeting goals, pt back to work. Total Treatment Billable Duration:   55 minutes  PT Patient Time In/Time Out  Time In: 0800  Time Out: 2521  Karin Generous, PTA    No future appointments.   Visit Approval Visit # Therapist initials Date A NS Cx Comments    1   [x]  [] [] Initial evaluation       [] [] []     5  11/12 [x] [] []     6  11/17 [x] [] []     7  11/24 [x] [] []     8  12/1/20 [x] [] [] reeval    9  12/3/20 [x] [] []     10  12/8/20 [x] [] []     11  12/10/20 [x] [] []     12  12/15/20 [x] [] []     13  12/17/20 [x] [] []     14  12/22/20 [x] [] []       12/29/20 [] [x] [x]     15 PDE 1-5-21 [x] [] []      PDE 1- [] [x] []     16 PDE 1- [x] [] [] dc       [] [] []        [] [] []

## (undated) DEVICE — SUTURE PDS II SZ 0 L27IN ABSRB VLT L26MM CT-2 1/2 CIR Z334H

## (undated) DEVICE — 4-PORT MANIFOLD: Brand: NEPTUNE 2

## (undated) DEVICE — SUTURE FIBERWIRE SZ 5 L38IN NONABSORBABLE BLU L48MM 1/2 AR7211

## (undated) DEVICE — TUBING, SUCTION, 1/4" X 10', STRAIGHT: Brand: MEDLINE

## (undated) DEVICE — DRAPE TWL SURG 16X26IN BLU ORB04] ALLCARE INC]

## (undated) DEVICE — INTENDED FOR TISSUE SEPARATION, AND OTHER PROCEDURES THAT REQUIRE A SHARP SURGICAL BLADE TO PUNCTURE OR CUT.: Brand: BARD-PARKER ® STAINLESS STEEL BLADES

## (undated) DEVICE — PADDING CAST W6INXL4YD ST COT COHESIVE HND TEARABLE SPEC

## (undated) DEVICE — STOCKINETTE ORTH W9XL36IN COT 2 PLY HLLW FOR HANDLING LMB

## (undated) DEVICE — BUTTON SWITCH PENCIL BLADE ELECTRODE, HOLSTER: Brand: EDGE

## (undated) DEVICE — ULTRABRAID 2 COBRAID 38 LENGTH SINGL: Brand: ULTRABRAID

## (undated) DEVICE — Device

## (undated) DEVICE — STERILE HOOK LOCK LATEX FREE ELASTIC BANDAGE 6INX5YD: Brand: HOOK LOCK™

## (undated) DEVICE — ZIMMER® STERILE DISPOSABLE TOURNIQUET CUFF WITH PLC, DUAL PORT, SINGLE BLADDER, 30 IN. (76 CM)

## (undated) DEVICE — 3M™ COBAN™ SELF-ADHERENT WRAP, 1586S, STERILE, 6 IN X 5 YD (15 CM X 4,5 M), 12 ROLLS/CASE: Brand: 3M™ COBAN™

## (undated) DEVICE — REM POLYHESIVE ADULT PATIENT RETURN ELECTRODE: Brand: VALLEYLAB

## (undated) DEVICE — SUTURE VCRL SZ 2-0 L27IN ABSRB UD L36MM CP-1 1/2 CIR REV J266H

## (undated) DEVICE — SUTURE FIBERWIRE SZ 2 W/ TAPERED NEEDLE BLUE L38IN NONABSORB BLU L26.5MM 1/2 CIRCLE AR7200

## (undated) DEVICE — Z DISCONTINUED NO SUB IDED NEEDLE SUT SZ 2 KEITH ABD STR TRIANG PT

## (undated) DEVICE — BRACE KNEE FOR 26IN FOAM TELSCP FULL TECHNOLOGY QUIK LOK

## (undated) DEVICE — (D)STRIP SKN CLSR 0.5X4IN WHT --

## (undated) DEVICE — CARDINAL HEALTH FLEXIBLE LIGHT HANDLE COVER: Brand: CARDINAL HEALTH

## (undated) DEVICE — BNDG,ELSTC,MATRIX,STRL,4"X5YD,LF,HOOK&LP: Brand: MEDLINE

## (undated) DEVICE — MASTISOL ADHESIVE LIQ 2/3ML

## (undated) DEVICE — AMD ANTIMICROBIAL GAUZE SPONGES,12 PLY USP TYPE VII, 0.2% POLYHEXAMETHYLENE BIGUANIDE HCI (PHMB): Brand: CURITY

## (undated) DEVICE — 1.2 ENDOBUTTON CL ULTRA PAC: Brand: ENDOBUTTON

## (undated) DEVICE — HEWSON SUTURE RETRIEVER: Brand: HEWSON SUTURE RETRIEVER

## (undated) DEVICE — SUTURE MCRYL SZ 3-0 L27IN ABSRB UD L19MM PS-2 3/8 CIR PRIM Y427H